# Patient Record
Sex: MALE | Race: WHITE | NOT HISPANIC OR LATINO | Employment: OTHER | URBAN - METROPOLITAN AREA
[De-identification: names, ages, dates, MRNs, and addresses within clinical notes are randomized per-mention and may not be internally consistent; named-entity substitution may affect disease eponyms.]

---

## 2017-05-25 ENCOUNTER — HISTORICAL (OUTPATIENT)
Dept: RADIOLOGY | Facility: HOSPITAL | Age: 77
End: 2017-05-25

## 2017-11-28 ENCOUNTER — HISTORICAL (OUTPATIENT)
Dept: RADIOLOGY | Facility: HOSPITAL | Age: 77
End: 2017-11-28

## 2018-04-30 ENCOUNTER — HISTORICAL (OUTPATIENT)
Dept: ADMINISTRATIVE | Facility: HOSPITAL | Age: 78
End: 2018-04-30

## 2018-05-10 ENCOUNTER — HISTORICAL (OUTPATIENT)
Dept: ADMINISTRATIVE | Facility: HOSPITAL | Age: 78
End: 2018-05-10

## 2018-05-10 LAB
ABS NEUT (OLG): 5.4 X10(3)/MCL (ref 2.1–9.2)
ALBUMIN SERPL-MCNC: 3.2 GM/DL (ref 3.4–5)
ALBUMIN/GLOB SERPL: 1 RATIO (ref 1.1–2)
ALP SERPL-CCNC: 121 UNIT/L (ref 50–136)
ALT SERPL-CCNC: 20 UNIT/L (ref 12–78)
AST SERPL-CCNC: 15 UNIT/L (ref 15–37)
BASOPHILS # BLD AUTO: 0 X10(3)/MCL (ref 0–0.2)
BASOPHILS NFR BLD AUTO: 0.3 %
BILIRUB SERPL-MCNC: 0.3 MG/DL (ref 0.2–1)
BILIRUBIN DIRECT+TOT PNL SERPL-MCNC: 0.1 MG/DL (ref 0–0.5)
BILIRUBIN DIRECT+TOT PNL SERPL-MCNC: 0.2 MG/DL (ref 0–0.8)
BUN SERPL-MCNC: 19 MG/DL (ref 7–18)
CALCIUM SERPL-MCNC: 9 MG/DL (ref 8.5–10.1)
CHLORIDE SERPL-SCNC: 105 MMOL/L (ref 98–107)
CO2 SERPL-SCNC: 29 MMOL/L (ref 21–32)
CREAT SERPL-MCNC: 0.9 MG/DL (ref 0.7–1.3)
EOSINOPHIL # BLD AUTO: 0.3 X10(3)/MCL (ref 0–0.9)
EOSINOPHIL NFR BLD AUTO: 4.2 %
ERYTHROCYTE [DISTWIDTH] IN BLOOD BY AUTOMATED COUNT: 16.5 % (ref 11.5–17)
FOLATE SERPL-MCNC: 18.6 NG/ML (ref 3.1–17.5)
GLOBULIN SER-MCNC: 3.2 GM/DL (ref 2.4–3.5)
GLUCOSE SERPL-MCNC: 96 MG/DL (ref 74–106)
HCT VFR BLD AUTO: 30.3 % (ref 42–52)
HGB BLD-MCNC: 9 GM/DL (ref 14–18)
IRON SATN MFR SERPL: 6.2 % (ref 20–50)
IRON SERPL-MCNC: 22 MCG/DL (ref 50–175)
LDH SERPL-CCNC: 211 UNIT/L (ref 87–241)
LYMPHOCYTES # BLD AUTO: 0.7 X10(3)/MCL (ref 0.6–4.6)
LYMPHOCYTES NFR BLD AUTO: 9.5 %
MCH RBC QN AUTO: 25.9 PG (ref 27–31)
MCHC RBC AUTO-ENTMCNC: 29.7 GM/DL (ref 33–36)
MCV RBC AUTO: 87.3 FL (ref 80–94)
MONOCYTES # BLD AUTO: 0.8 X10(3)/MCL (ref 0.1–1.3)
MONOCYTES NFR BLD AUTO: 10.5 %
NEUTROPHILS # BLD AUTO: 5.4 X10(3)/MCL (ref 2.1–9.2)
NEUTROPHILS NFR BLD AUTO: 75.5 %
PLATELET # BLD AUTO: 289 X10(3)/MCL (ref 130–400)
PMV BLD AUTO: 8.5 FL (ref 9.4–12.4)
POTASSIUM SERPL-SCNC: 4.5 MMOL/L (ref 3.5–5.1)
PROT SERPL-MCNC: 6.4 GM/DL (ref 6.4–8.2)
RBC # BLD AUTO: 3.47 X10(6)/MCL (ref 4.7–6.1)
SODIUM SERPL-SCNC: 140 MMOL/L (ref 136–145)
TIBC SERPL-MCNC: 353 MCG/DL (ref 250–450)
TRANSFERRIN SERPL-MCNC: 276 MG/DL (ref 200–360)
VIT B12 SERPL-MCNC: 870 PG/ML (ref 193–986)
WBC # SPEC AUTO: 7.2 X10(3)/MCL (ref 4.5–11.5)

## 2018-05-15 ENCOUNTER — HISTORICAL (OUTPATIENT)
Dept: RADIOLOGY | Facility: HOSPITAL | Age: 78
End: 2018-05-15

## 2018-08-21 ENCOUNTER — HISTORICAL (OUTPATIENT)
Dept: HEMATOLOGY/ONCOLOGY | Facility: CLINIC | Age: 78
End: 2018-08-21

## 2018-08-21 LAB
ABS NEUT (OLG): 7.99 X10(3)/MCL (ref 2.1–9.2)
ALBUMIN SERPL-MCNC: 3.5 GM/DL (ref 3.4–5)
ALBUMIN/GLOB SERPL: 1 {RATIO}
ALP SERPL-CCNC: 136 UNIT/L (ref 50–136)
ALT SERPL-CCNC: 25 UNIT/L (ref 12–78)
AST SERPL-CCNC: 25 UNIT/L (ref 15–37)
BASOPHILS # BLD AUTO: 0 X10(3)/MCL (ref 0–0.2)
BASOPHILS NFR BLD AUTO: 0.5 %
BILIRUB SERPL-MCNC: 0.2 MG/DL (ref 0.2–1)
BILIRUBIN DIRECT+TOT PNL SERPL-MCNC: 0.1 MG/DL (ref 0–0.2)
BILIRUBIN DIRECT+TOT PNL SERPL-MCNC: 0.1 MG/DL (ref 0–0.8)
BUN SERPL-MCNC: 16 MG/DL (ref 7–18)
CALCIUM SERPL-MCNC: 8.3 MG/DL (ref 8.5–10.1)
CHLORIDE SERPL-SCNC: 101 MMOL/L (ref 98–107)
CO2 SERPL-SCNC: 28 MMOL/L (ref 21–32)
CREAT SERPL-MCNC: 1.03 MG/DL (ref 0.7–1.3)
EOSINOPHIL # BLD AUTO: 0.2 X10(3)/MCL (ref 0–0.9)
EOSINOPHIL NFR BLD AUTO: 2.5 %
ERYTHROCYTE [DISTWIDTH] IN BLOOD BY AUTOMATED COUNT: 22.8 % (ref 11.5–17)
GLOBULIN SER-MCNC: 3.5 GM/DL (ref 2.4–3.5)
GLUCOSE SERPL-MCNC: 117 MG/DL (ref 74–106)
HCT VFR BLD AUTO: 37.7 % (ref 42–52)
HGB BLD-MCNC: 11.5 GM/DL (ref 14–18)
LYMPHOCYTES # BLD AUTO: 0.8 X10(3)/MCL (ref 0.6–4.6)
LYMPHOCYTES NFR BLD AUTO: 7.8 %
MCH RBC QN AUTO: 28.1 PG (ref 27–31)
MCHC RBC AUTO-ENTMCNC: 30.5 GM/DL (ref 33–36)
MCV RBC AUTO: 92.2 FL (ref 80–94)
MONOCYTES # BLD AUTO: 0.8 X10(3)/MCL (ref 0.1–1.3)
MONOCYTES NFR BLD AUTO: 7.8 %
NEUTROPHILS # BLD AUTO: 8 X10(3)/MCL (ref 2.1–9.2)
NEUTROPHILS NFR BLD AUTO: 81.4 %
PLATELET # BLD AUTO: 236 X10(3)/MCL (ref 130–400)
PMV BLD AUTO: 8.5 FL (ref 9.4–12.4)
POC CREATININE: 1 MG/DL (ref 0.6–1.3)
POTASSIUM SERPL-SCNC: 4.2 MMOL/L (ref 3.5–5.1)
PROT SERPL-MCNC: 7 GM/DL (ref 6.4–8.2)
PSA SERPL-MCNC: <0.01 NG/ML (ref 0–4)
RBC # BLD AUTO: 4.09 X10(6)/MCL (ref 4.7–6.1)
SODIUM SERPL-SCNC: 138 MMOL/L (ref 136–145)
WBC # SPEC AUTO: 9.8 X10(3)/MCL (ref 4.5–11.5)

## 2019-05-03 ENCOUNTER — HISTORICAL (OUTPATIENT)
Dept: ADMINISTRATIVE | Facility: HOSPITAL | Age: 79
End: 2019-05-03

## 2019-05-03 LAB
ALBUMIN SERPL-MCNC: 2.9 GM/DL (ref 3.4–5)
ALP SERPL-CCNC: 98 UNIT/L (ref 50–136)
ALT SERPL-CCNC: 17 UNIT/L (ref 12–78)
AST SERPL-CCNC: 13 UNIT/L (ref 15–37)
BILIRUB SERPL-MCNC: 0.4 MG/DL (ref 0.2–1)
BILIRUBIN DIRECT+TOT PNL SERPL-MCNC: 0.1 MG/DL (ref 0–0.2)
BILIRUBIN DIRECT+TOT PNL SERPL-MCNC: 0.3 MG/DL (ref 0–0.8)
BUN SERPL-MCNC: 14 MG/DL (ref 7–18)
CALCIUM SERPL-MCNC: 8.7 MG/DL (ref 8.5–10.1)
CHLORIDE SERPL-SCNC: 103 MMOL/L (ref 98–107)
CHOLEST SERPL-MCNC: 150 MG/DL (ref 0–200)
CHOLEST/HDLC SERPL: 3.3 {RATIO} (ref 0–5)
CO2 SERPL-SCNC: 28 MMOL/L (ref 21–32)
CREAT SERPL-MCNC: 0.7 MG/DL (ref 0.7–1.3)
CREAT/UREA NIT SERPL: 20
GLUCOSE SERPL-MCNC: 107 MG/DL (ref 74–106)
HDLC SERPL-MCNC: 46 MG/DL (ref 35–60)
LDLC SERPL CALC-MCNC: 81 MG/DL (ref 0–129)
LIVER PROFILE INTERP: ABNORMAL
POTASSIUM SERPL-SCNC: 3.7 MMOL/L (ref 3.5–5.1)
PROT SERPL-MCNC: 6.2 GM/DL (ref 6.4–8.2)
SODIUM SERPL-SCNC: 138 MMOL/L (ref 136–145)
TRIGL SERPL-MCNC: 115 MG/DL (ref 30–150)
TSH SERPL-ACNC: 0.16 MIU/L (ref 0.36–3.74)
VLDLC SERPL CALC-MCNC: 23 MG/DL

## 2019-10-11 ENCOUNTER — HISTORICAL (OUTPATIENT)
Dept: RADIOLOGY | Facility: HOSPITAL | Age: 79
End: 2019-10-11

## 2019-10-11 LAB — POC CREATININE: 0.9 MG/DL (ref 0.6–1.3)

## 2019-10-24 ENCOUNTER — HISTORICAL (OUTPATIENT)
Dept: RADIOLOGY | Facility: HOSPITAL | Age: 79
End: 2019-10-24

## 2020-08-06 ENCOUNTER — HISTORICAL (OUTPATIENT)
Dept: RADIOLOGY | Facility: HOSPITAL | Age: 80
End: 2020-08-06

## 2020-08-06 LAB — POC CREATININE: 0.8 MG/DL (ref 0.6–1.3)

## 2020-10-22 ENCOUNTER — HISTORICAL (OUTPATIENT)
Dept: RADIOLOGY | Facility: HOSPITAL | Age: 80
End: 2020-10-22

## 2020-10-22 LAB — POC CREATININE: 1 MG/DL (ref 0.6–1.3)

## 2020-11-25 ENCOUNTER — HISTORICAL (OUTPATIENT)
Dept: RADIOLOGY | Facility: HOSPITAL | Age: 80
End: 2020-11-25

## 2020-11-25 LAB — POC CREATININE: 1 MG/DL (ref 0.6–1.3)

## 2021-11-03 ENCOUNTER — HISTORICAL (OUTPATIENT)
Dept: ADMINISTRATIVE | Facility: HOSPITAL | Age: 81
End: 2021-11-03

## 2021-11-12 ENCOUNTER — HISTORICAL (OUTPATIENT)
Dept: RADIOLOGY | Facility: HOSPITAL | Age: 81
End: 2021-11-12

## 2022-03-10 ENCOUNTER — HISTORICAL (OUTPATIENT)
Dept: RESPIRATORY THERAPY | Facility: HOSPITAL | Age: 82
End: 2022-03-10

## 2022-04-11 ENCOUNTER — HISTORICAL (OUTPATIENT)
Dept: ADMINISTRATIVE | Facility: HOSPITAL | Age: 82
End: 2022-04-11
Payer: MEDICARE

## 2022-04-27 VITALS
OXYGEN SATURATION: 94 % | BODY MASS INDEX: 33.59 KG/M2 | DIASTOLIC BLOOD PRESSURE: 72 MMHG | WEIGHT: 248 LBS | HEIGHT: 72 IN | SYSTOLIC BLOOD PRESSURE: 114 MMHG

## 2022-04-28 ENCOUNTER — HISTORICAL (OUTPATIENT)
Dept: RADIOLOGY | Facility: HOSPITAL | Age: 82
End: 2022-04-28
Payer: MEDICARE

## 2022-05-05 ENCOUNTER — LAB VISIT (OUTPATIENT)
Dept: LAB | Facility: HOSPITAL | Age: 82
End: 2022-05-05
Attending: INTERNAL MEDICINE
Payer: MEDICARE

## 2022-05-05 ENCOUNTER — PROCEDURE VISIT (OUTPATIENT)
Dept: RESPIRATORY THERAPY | Facility: HOSPITAL | Age: 82
End: 2022-05-05
Attending: INTERNAL MEDICINE
Payer: MEDICARE

## 2022-05-05 DIAGNOSIS — R55 SYNCOPE AND COLLAPSE: ICD-10-CM

## 2022-05-05 DIAGNOSIS — J44.9 CHRONIC OBSTRUCTIVE PULMONARY DISEASE WITH BRONCHOSPASM: ICD-10-CM

## 2022-05-05 DIAGNOSIS — R42 DIZZINESS: ICD-10-CM

## 2022-05-05 DIAGNOSIS — R53.81: ICD-10-CM

## 2022-05-05 DIAGNOSIS — J44.89 OBSTRUCTIVE CHRONIC BRONCHITIS WITHOUT EXACERBATION: ICD-10-CM

## 2022-05-05 DIAGNOSIS — E78.00 PURE HYPERCHOLESTEROLEMIA: ICD-10-CM

## 2022-05-05 DIAGNOSIS — K21.9 GASTROESOPHAGEAL REFLUX DISEASE, UNSPECIFIED WHETHER ESOPHAGITIS PRESENT: ICD-10-CM

## 2022-05-05 DIAGNOSIS — R06.02 SHORTNESS OF BREATH: ICD-10-CM

## 2022-05-05 DIAGNOSIS — R53.1 ASTHENIA: ICD-10-CM

## 2022-05-05 DIAGNOSIS — I25.10 CORONARY ATHEROSCLEROSIS OF NATIVE CORONARY ARTERY: ICD-10-CM

## 2022-05-05 DIAGNOSIS — B20: ICD-10-CM

## 2022-05-05 DIAGNOSIS — E11.9 DIABETES: Primary | ICD-10-CM

## 2022-05-05 DIAGNOSIS — E34.9 TESTOSTERONE DEFICIENCY: ICD-10-CM

## 2022-05-05 DIAGNOSIS — R06.00 DYSPNEA, UNSPECIFIED TYPE: ICD-10-CM

## 2022-05-05 LAB
ALBUMIN SERPL-MCNC: 3.1 GM/DL (ref 3.4–4.8)
ALBUMIN/GLOB SERPL: 1.1 RATIO (ref 1.1–2)
ALP SERPL-CCNC: 92 UNIT/L (ref 40–150)
ALT SERPL-CCNC: 18 UNIT/L (ref 0–55)
AST SERPL-CCNC: 21 UNIT/L (ref 5–34)
BASOPHILS # BLD AUTO: 0.04 X10(3)/MCL (ref 0–0.2)
BASOPHILS NFR BLD AUTO: 0.5 %
BILIRUBIN DIRECT+TOT PNL SERPL-MCNC: 0.2 MG/DL (ref 0–0.5)
BILIRUBIN DIRECT+TOT PNL SERPL-MCNC: 0.2 MG/DL (ref 0–0.8)
BILIRUBIN DIRECT+TOT PNL SERPL-MCNC: 0.4 MG/DL
BUN SERPL-MCNC: 16.2 MG/DL (ref 8.4–25.7)
CALCIUM SERPL-MCNC: 9.3 MG/DL (ref 8.8–10)
CHLORIDE SERPL-SCNC: 100 MMOL/L (ref 98–107)
CHOLEST SERPL-MCNC: 142 MG/DL
CHOLEST/HDLC SERPL: 5 {RATIO} (ref 0–5)
CO2 SERPL-SCNC: 30 MMOL/L (ref 23–31)
CREAT SERPL-MCNC: 0.79 MG/DL (ref 0.73–1.18)
EOSINOPHIL # BLD AUTO: 0.24 X10(3)/MCL (ref 0–0.9)
EOSINOPHIL NFR BLD AUTO: 3 %
ERYTHROCYTE [DISTWIDTH] IN BLOOD BY AUTOMATED COUNT: 15.3 % (ref 11.5–17)
EST. AVERAGE GLUCOSE BLD GHB EST-MCNC: 139.9 MG/DL
GLOBULIN SER-MCNC: 2.9 GM/DL (ref 2.4–3.5)
GLUCOSE SERPL-MCNC: 109 MG/DL (ref 82–115)
HBA1C MFR BLD: 6.5 %
HCT VFR BLD AUTO: 38.8 % (ref 42–52)
HDLC SERPL-MCNC: 30 MG/DL (ref 35–60)
HGB BLD-MCNC: 11.9 GM/DL (ref 14–18)
IMM GRANULOCYTES # BLD AUTO: 0.02 X10(3)/MCL (ref 0–0.02)
IMM GRANULOCYTES NFR BLD AUTO: 0.2 % (ref 0–0.43)
INR BLD: 1.16 (ref 0–1.3)
LDLC SERPL CALC-MCNC: 88 MG/DL (ref 50–140)
LYMPHOCYTES # BLD AUTO: 0.8 X10(3)/MCL (ref 0.6–4.6)
LYMPHOCYTES NFR BLD AUTO: 9.9 %
MAGNESIUM SERPL-MCNC: 2 MG/DL (ref 1.6–2.6)
MCH RBC QN AUTO: 27.1 PG (ref 27–31)
MCHC RBC AUTO-ENTMCNC: 30.7 MG/DL (ref 33–36)
MCV RBC AUTO: 88.4 FL (ref 80–94)
MONOCYTES # BLD AUTO: 0.9 X10(3)/MCL (ref 0.1–1.3)
MONOCYTES NFR BLD AUTO: 11.2 %
NEUTROPHILS # BLD AUTO: 6.1 X10(3)/MCL (ref 2.1–9.2)
NEUTROPHILS NFR BLD AUTO: 75.2 %
NRBC BLD AUTO-RTO: 0 %
PLATELET # BLD AUTO: 245 X10(3)/MCL (ref 130–400)
PMV BLD AUTO: 9.8 FL (ref 9.4–12.4)
POTASSIUM SERPL-SCNC: 3.4 MMOL/L (ref 3.5–5.1)
PROT SERPL-MCNC: 6 GM/DL (ref 5.8–7.6)
PROTHROMBIN TIME: 14.5 SECONDS (ref 12.5–14.5)
RBC # BLD AUTO: 4.39 X10(6)/MCL (ref 4.7–6.1)
SODIUM SERPL-SCNC: 143 MMOL/L (ref 136–145)
TRIGL SERPL-MCNC: 122 MG/DL (ref 34–140)
VLDLC SERPL CALC-MCNC: 24 MG/DL
WBC # SPEC AUTO: 8.1 X10(3)/MCL (ref 4.5–11.5)

## 2022-05-05 PROCEDURE — 84403 ASSAY OF TOTAL TESTOSTERONE: CPT

## 2022-05-05 PROCEDURE — 83735 ASSAY OF MAGNESIUM: CPT

## 2022-05-05 PROCEDURE — 80053 COMPREHEN METABOLIC PANEL: CPT

## 2022-05-05 PROCEDURE — 82803 BLOOD GASES ANY COMBINATION: CPT

## 2022-05-05 PROCEDURE — 80061 LIPID PANEL: CPT

## 2022-05-05 PROCEDURE — 36600 WITHDRAWAL OF ARTERIAL BLOOD: CPT

## 2022-05-05 PROCEDURE — 85025 COMPLETE CBC W/AUTO DIFF WBC: CPT

## 2022-05-05 PROCEDURE — 99900035 HC TECH TIME PER 15 MIN (STAT)

## 2022-05-05 PROCEDURE — 85610 PROTHROMBIN TIME: CPT

## 2022-05-05 PROCEDURE — 36415 COLL VENOUS BLD VENIPUNCTURE: CPT

## 2022-05-05 PROCEDURE — 83036 HEMOGLOBIN GLYCOSYLATED A1C: CPT

## 2022-05-05 NOTE — HISTORICAL [facility]
This is a historical note converted from [place]. Formatting and pictures may have been removed.  Please reference [place] for original formatting and attached multimedia. Chief Complaint  9 week follow up Right foot- toe nad Hand fracture from a fall - Non op. Here for eval and xrays. DOing ok c/o pain and swelling. Amb with walker.  History of Present Illness  He is a pleasant 77-year-old who I initially saw in the hospital when he had a right small toe fracture.? He returns today with?swelling over bilateral lower extremities and bilateral hands right worse than left.? He denies any acute injury.? He will occasionally have pain associated with these.? He is mainly ambulating with a walker.  Review of Systems  Comprehensive review of system?was performed with no exceptions other than noted in the history of present illness  Physical Exam  Vitals & Measurements  BP:?146/90?  HT:?182?cm? HT:?182?cm? HT:?182?cm? WT:?108.4?kg? WT:?108.4?kg? WT:?108.4?kg? BMI:?32.73?  Gen: WN, WD, NAD  Card/Res: NL breathing, +distal pulses  Abdomen: ND  Muscular skeletal:?Exam over his right hand does show some soft tissue swelling?right greater than left. ?He is got?ulnar deviation of his small finger. ?He has full range of motion without malrotation.? He is got full strength. ?Distally is neurovascularly intact.? Exam of bilateral lower extremities show some?edema bilaterally. ?He is nontender?over his right fifth?toe.? He has full range of motion of his feet and ankle.? Distally his neurovascular intact  Assessment/Plan  1.?Lower extremity edema  I recommended some compression?hose and I will get him over his primary care physician to evaluate his heart lungs and kidneys.  Ordered:  Office/Outpatient Visit Level 3 Established 71000 PC, Lower extremity edema  Toe fracture, [facility], 04/30/18 11:37:00 CDT  ?  Toe fracture  The fracture is healed.? Activities as tolerated  Ordered:  Office/Outpatient Visit Level 3  Established [id number] PC, Lower extremity edema  Toe fracture, [facility], 04/30/18 11:37:00 CDT  ?  Orders:  Clinic Follow-up PRN, 04/30/18 11:37:00 CDT, Future Order, [facility]  XR Foot Left Minimum 3 Views, Routine, 04/30/18 10:35:00 CDT, pain, None, Ambulatory, Rad Type, 04/30/18 10:35:00 CDT   Problem List/Past Medical History  Ongoing  Able to lie down  ATRIAL FIBRILLATION  BPH (benign prostatic hyperplasia)  Cervicalgia  COPD (chronic obstructive pulmonary disease)  Essential hypertension  Exercise intolerance  Lumbago  Lung cancer  Obesity  Paresthesia of right arm  Tobacco user  Tobacco user  Historical  Apnea, sleep  Cataplexy and narcolepsy  Non-small cell carcinoma of lung  Procedure/Surgical History  Repair Right Hand Skin, External Approach (02/17/2018), Simple repair of superficial wounds of scalp, neck, axillae, external genitalia, trunk and/or extremities (including hands and feet); 2.5 cm or less (02/17/2018), Carotid Artery Endarterectomy (Left) (02/12/2018), Extirpation of Matter from Left Common Carotid Artery, Open Approach (02/12/2018), Extirpation of Matter from Left External Carotid Artery, Open Approach (02/12/2018), Extirpation of Matter from Left Internal Carotid Artery, Open Approach (02/12/2018), Drainage of Right Pleural Cavity, Percutaneous Approach (01/30/2018), Resection of Prostate, Via Natural or Artificial Opening (01/12/2016), Transurethral Resection Prostate (None) (01/12/2016), Transurethral resection; residual or regrowth of obstructive prostate tissue including control of postoperative bleeding, complete (vasectomy, meatotomy, cystourethroscopy, urethral calibration and/or dilation, and internal urethrotomy are included) (01/12/2016), DIRECT ADMISSION OF PATIENT FOR HOSPITAL OBSERVATION CARE (02/25/2014), HOSPITAL OBSERVATION SERVICE, PER HOUR (02/25/2014), HOSPITAL OBSERVATION SERVICE, PER HOUR (02/25/2014), HOSPITAL OBSERVATION SERVICE, PER HOUR  (02/25/2014), HOSPITAL OBSERVATION SERVICE, PER HOUR (02/25/2014), HOSPITAL OBSERVATION SERVICE, PER HOUR (02/25/2014), HOSPITAL OBSERVATION SERVICE, PER HOUR (02/25/2014), HOSPITAL OBSERVATION SERVICE, PER HOUR (02/25/2014), HOSPITAL OBSERVATION SERVICE, PER HOUR (02/25/2014), HOSPITAL OBSERVATION SERVICE, PER HOUR (02/25/2014), HOSPITAL OBSERVATION SERVICE, PER HOUR (02/25/2014), HOSPITAL OBSERVATION SERVICE, PER HOUR (02/25/2014), HOSPITAL OBSERVATION SERVICE, PER HOUR (02/25/2014), HOSPITAL OBSERVATION SERVICE, PER HOUR (02/25/2014), HOSPITAL OBSERVATION SERVICE, PER HOUR (02/25/2014), HOSPITAL OBSERVATION SERVICE, PER HOUR (02/25/2014), HOSPITAL OBSERVATION SERVICE, PER HOUR (02/25/2014), HOSPITAL OBSERVATION SERVICE, PER HOUR (02/25/2014), HOSPITAL OBSERVATION SERVICE, PER HOUR (02/25/2014), HOSPITAL OBSERVATION SERVICE, PER HOUR (02/25/2014), HOSPITAL OBSERVATION SERVICE, PER HOUR (02/25/2014), HOSPITAL OBSERVATION SERVICE, PER HOUR (02/25/2014), HOSPITAL OBSERVATION SERVICE, PER HOUR (02/25/2014), HOSPITAL OBSERVATION SERVICE, PER HOUR (02/25/2014), HOSPITAL OBSERVATION SERVICE, PER HOUR (02/25/2014), HOSPITAL OBSERVATION SERVICE, PER HOUR (02/25/2014), HOSPITAL OBSERVATION SERVICE, PER HOUR (02/25/2014), HOSPITAL OBSERVATION SERVICE, PER HOUR (02/25/2014), Litholapaxy: crushing or fragmentation of calculus by any means in bladder and removal of fragments; simple or small (less than 2.5 cm). (02/25/2014), Lithopaxy (.) (02/25/2014), Other cystoscopy (02/25/2014), Other transurethral prostatectomy (02/25/2014), Transurethral clearance of bladder (02/25/2014), Transurethral electrosurgical resection of prostate, including control of postoperative bleeding, complete (vasectomy, meatotomy, cystourethroscopy, urethral calibration and/or dilation, and internal urethrotomy are included). (02/25/2014), Transurethral Resection Prostate (.) (02/25/2014), Insertion of indwelling urinary catheter (02/09/2014), Insertion  of temporary indwelling bladder catheter; simple (eg, Monsalve). (02/09/2014), Thoracentesis (12/02/2013), Biopsy Gastrointestional (11/27/2013), Colonoscopy (11/27/2013), Endoscopic polypectomy of large intestine (11/27/2013), Esophagogastroduodenoscopy (11/27/2013), Esophagogastroduodenoscopy [EGD] with closed biopsy (11/27/2013), Polypectomy (11/27/2013), Biopsy, lung or mediastinum, percutaneous needle. (03/22/2012), Closed [percutaneous] [needle] biopsy of lung (03/22/2012), Appendectomy;., back surgery, bilateral inguinal hernia repair, Cataract, rotator cuff repair, tonsillectomy.  Medications  albuterol 90 mcg/inh inhalation powder, 2 puff(s), INH, BID, PRN  amantadine 100 mg oral capsule, 100 mg= 1 cap(s), Oral, BID  atorvastatin 40 mg oral tablet, 40 mg= 1 tab(s), Oral, Daily  buPROPion 150 mg oral tablet, extended release, 150 mg= 1 tab(s), Oral, Daily  ELIQUIS 5 MG TAB, 5 mg= 1 tab(s), Oral, BID  famotidine 20 mg oral tablet, 20 mg= 1 tab(s), Oral, BID  ipratropium 500 mcg/2.5 mL inhalation solution, 500 mcg= 2.5 mL, NEB, q6hr Resp  memantine 10 mg oral tablet, 10 mg= 1 tab(s), Oral, BID  Protonix 40 mg ORAL enteric coated tablet, 40 mg= 1 tab(s), Oral, Daily  Prozac 20 mg oral capsule, 20 mg= 1 cap(s), Oral, Daily  Risperdal 1 mg oral tablet, 1 mg= 1 tab(s), Oral, Daily  Spiriva Respimat 2.5 mcg/inh inhalation aerosol, 2 puff(s), INH, Daily, 11 refills  Synthroid 125 mcg (0.125 mg) oral tablet, 125 mcg= 1 tab(s), Oral, Daily  Vitamin B-12 500 mcg oral tablet, 1 tab, Oral, Daily  Allergies  Demerol HCl?(UNKNOWN)  Social History  Alcohol - Low Risk, 11/26/2013  Current, Beer, 01/03/2016  Employment/School  Retired, Highest education level: High school., 01/03/2016  Home/Environment  Lives with Spouse. Living situation: Home/Independent. Home equipment: CPAP/BiPAP. Family/Friends available for support: Yes., 02/19/2018  Nutrition/Health  Regular, 01/03/2016  Substance Abuse - Denies Substance Abuse,  02/25/2014  Never, 05/08/2017  Tobacco - Denies Tobacco Use, 11/26/2013  Current some day smoker, Cigarettes, 10 per day. 57 year(s). Started age 20.0 Years. Stopped age 77 Years., 02/19/2018  Family History  Cancer - unknown origin: Sister.  Diabetes: Father.  Immunizations  Vaccine Date Status Comments   tetanus/diphtheria/pertussis, acel(Tdap) - Not Given Contraindicated - Do not give     Diagnostic Results  Foot radiographs 4/30/2018: 3 views of the left foot?show no fracture or arthrosis

## 2022-05-06 LAB
BE: 7.3
CALCIUM BLD-MCNC: 1.18 MMOL/L
CO2 TOTAL: 33.4
COHB ART: 2.3
HCO3 ARTERIAL: 32 MMOL/L (ref 18–23)
METHB ART: 0.7
O2HB: 92.1
PCO2 ARTERIAL: 45
PH ARTERIAL: 7.46
PO2 ARTERIAL: 62
POTASSIUM BLOOD ARTERIAL: 3
SATURATED O2 ARTERIAL, I-STAT: 92.7
SODIUM BLOOD ARTERIAL: 135
THB ART: 11.4

## 2022-05-06 NOTE — CARE UPDATE
05/05/22 1000   Critical Value Communication   Date Result Received 05/05/22   Time Result Received 1000   Resulting Department of Critical Value resp   Who communicated critical value from resulting department? sd rrt   Critical Test #1 ph   Critical Test #1 Result 7.46   Critical Test #2 pco2   Critical Test #2 Result 45   Critical Test #3  po2   Critical Test #3 Result 62   Critical Test #4 BE   Critical Test #4 Result 7.3   Critical Test #5 hco2   Critical Test #5 Result 32   sO2 92.7  Thb 11.4  Ohb 92.1  COHb 2.3  metHb 0.7  Na 135  K+ 3.0  Ca++ 1.18  Results faxed to dr OTONIEL Hyde's office

## 2022-06-01 ENCOUNTER — HOSPITAL ENCOUNTER (EMERGENCY)
Facility: HOSPITAL | Age: 82
Discharge: HOME | End: 2022-06-01
Attending: STUDENT IN AN ORGANIZED HEALTH CARE EDUCATION/TRAINING PROGRAM
Payer: MEDICARE

## 2022-06-01 VITALS
RESPIRATION RATE: 20 BRPM | SYSTOLIC BLOOD PRESSURE: 126 MMHG | TEMPERATURE: 98 F | HEART RATE: 104 BPM | DIASTOLIC BLOOD PRESSURE: 85 MMHG | OXYGEN SATURATION: 96 %

## 2022-06-01 DIAGNOSIS — R05.9 COUGH: ICD-10-CM

## 2022-06-01 DIAGNOSIS — R19.7 DIARRHEA, UNSPECIFIED TYPE: ICD-10-CM

## 2022-06-01 DIAGNOSIS — R06.02 SOB (SHORTNESS OF BREATH): ICD-10-CM

## 2022-06-01 DIAGNOSIS — R11.2 NAUSEA AND VOMITING, INTRACTABILITY OF VOMITING NOT SPECIFIED, UNSPECIFIED VOMITING TYPE: Primary | ICD-10-CM

## 2022-06-01 LAB
ALBUMIN SERPL-MCNC: 3.2 GM/DL (ref 3.4–4.8)
ALBUMIN/GLOB SERPL: 1.2 RATIO (ref 1.1–2)
ALP SERPL-CCNC: 81 UNIT/L (ref 40–150)
ALT SERPL-CCNC: 22 UNIT/L (ref 0–55)
AST SERPL-CCNC: 23 UNIT/L (ref 5–34)
BASOPHILS # BLD AUTO: 0.03 X10(3)/MCL (ref 0–0.2)
BASOPHILS NFR BLD AUTO: 0.4 %
BILIRUBIN DIRECT+TOT PNL SERPL-MCNC: 0.4 MG/DL
BNP BLD-MCNC: 696.6 PG/ML
BUN SERPL-MCNC: 15.2 MG/DL (ref 8.4–25.7)
CALCIUM SERPL-MCNC: 9.3 MG/DL (ref 8.8–10)
CHLORIDE SERPL-SCNC: 103 MMOL/L (ref 98–107)
CO2 SERPL-SCNC: 26 MMOL/L (ref 23–31)
CREAT SERPL-MCNC: 0.71 MG/DL (ref 0.73–1.18)
EOSINOPHIL # BLD AUTO: 0.07 X10(3)/MCL (ref 0–0.9)
EOSINOPHIL NFR BLD AUTO: 0.9 %
ERYTHROCYTE [DISTWIDTH] IN BLOOD BY AUTOMATED COUNT: 14.6 % (ref 11.5–17)
FLUAV AG UPPER RESP QL IA.RAPID: NOT DETECTED
FLUBV AG UPPER RESP QL IA.RAPID: NOT DETECTED
GLOBULIN SER-MCNC: 2.7 GM/DL (ref 2.4–3.5)
GLUCOSE SERPL-MCNC: 130 MG/DL (ref 82–115)
HCT VFR BLD AUTO: 38.3 % (ref 42–52)
HGB BLD-MCNC: 12 GM/DL (ref 14–18)
IMM GRANULOCYTES # BLD AUTO: 0.03 X10(3)/MCL (ref 0–0.02)
IMM GRANULOCYTES NFR BLD AUTO: 0.4 % (ref 0–0.43)
LYMPHOCYTES # BLD AUTO: 0.68 X10(3)/MCL (ref 0.6–4.6)
LYMPHOCYTES NFR BLD AUTO: 8.8 %
MCH RBC QN AUTO: 27.6 PG (ref 27–31)
MCHC RBC AUTO-ENTMCNC: 31.3 MG/DL (ref 33–36)
MCV RBC AUTO: 88.2 FL (ref 80–94)
MONOCYTES # BLD AUTO: 0.73 X10(3)/MCL (ref 0.1–1.3)
MONOCYTES NFR BLD AUTO: 9.5 %
NEUTROPHILS # BLD AUTO: 6.2 X10(3)/MCL (ref 2.1–9.2)
NEUTROPHILS NFR BLD AUTO: 80 %
NRBC BLD AUTO-RTO: 0 %
PLATELET # BLD AUTO: 254 X10(3)/MCL (ref 130–400)
PMV BLD AUTO: 9.5 FL (ref 9.4–12.4)
POTASSIUM SERPL-SCNC: 3.6 MMOL/L (ref 3.5–5.1)
PROT SERPL-MCNC: 5.9 GM/DL (ref 5.8–7.6)
RBC # BLD AUTO: 4.34 X10(6)/MCL (ref 4.7–6.1)
SARS-COV-2 RNA RESP QL NAA+PROBE: NOT DETECTED
SODIUM SERPL-SCNC: 141 MMOL/L (ref 136–145)
TROPONIN I SERPL-MCNC: 0.01 NG/ML (ref 0–0.04)
WBC # SPEC AUTO: 7.7 X10(3)/MCL (ref 4.5–11.5)

## 2022-06-01 PROCEDURE — 85025 COMPLETE CBC W/AUTO DIFF WBC: CPT | Performed by: PHYSICIAN ASSISTANT

## 2022-06-01 PROCEDURE — 83880 ASSAY OF NATRIURETIC PEPTIDE: CPT | Performed by: PHYSICIAN ASSISTANT

## 2022-06-01 PROCEDURE — 63600175 PHARM REV CODE 636 W HCPCS: Performed by: STUDENT IN AN ORGANIZED HEALTH CARE EDUCATION/TRAINING PROGRAM

## 2022-06-01 PROCEDURE — 25000242 PHARM REV CODE 250 ALT 637 W/ HCPCS: Performed by: PHYSICIAN ASSISTANT

## 2022-06-01 PROCEDURE — 84075 ASSAY ALKALINE PHOSPHATASE: CPT | Performed by: PHYSICIAN ASSISTANT

## 2022-06-01 PROCEDURE — 96374 THER/PROPH/DIAG INJ IV PUSH: CPT

## 2022-06-01 PROCEDURE — 84484 ASSAY OF TROPONIN QUANT: CPT | Performed by: PHYSICIAN ASSISTANT

## 2022-06-01 PROCEDURE — 87636 SARSCOV2 & INF A&B AMP PRB: CPT | Performed by: PHYSICIAN ASSISTANT

## 2022-06-01 PROCEDURE — 96375 TX/PRO/DX INJ NEW DRUG ADDON: CPT

## 2022-06-01 PROCEDURE — 94640 AIRWAY INHALATION TREATMENT: CPT

## 2022-06-01 PROCEDURE — 99285 EMERGENCY DEPT VISIT HI MDM: CPT | Mod: 25

## 2022-06-01 PROCEDURE — 93005 ELECTROCARDIOGRAM TRACING: CPT

## 2022-06-01 PROCEDURE — 80053 COMPREHEN METABOLIC PANEL: CPT | Performed by: PHYSICIAN ASSISTANT

## 2022-06-01 RX ORDER — ONDANSETRON 2 MG/ML
4 INJECTION INTRAMUSCULAR; INTRAVENOUS
Status: COMPLETED | OUTPATIENT
Start: 2022-06-01 | End: 2022-06-01

## 2022-06-01 RX ORDER — MORPHINE SULFATE 4 MG/ML
4 INJECTION, SOLUTION INTRAMUSCULAR; INTRAVENOUS
Status: COMPLETED | OUTPATIENT
Start: 2022-06-01 | End: 2022-06-01

## 2022-06-01 RX ORDER — IPRATROPIUM BROMIDE AND ALBUTEROL SULFATE 2.5; .5 MG/3ML; MG/3ML
3 SOLUTION RESPIRATORY (INHALATION)
Status: COMPLETED | OUTPATIENT
Start: 2022-06-01 | End: 2022-06-01

## 2022-06-01 RX ORDER — ONDANSETRON 4 MG/1
4 TABLET, FILM COATED ORAL EVERY 6 HOURS
Qty: 56 TABLET | Refills: 0 | Status: SHIPPED | OUTPATIENT
Start: 2022-06-01 | End: 2022-06-15

## 2022-06-01 RX ADMIN — MORPHINE SULFATE 4 MG: 4 INJECTION INTRAVENOUS at 07:06

## 2022-06-01 RX ADMIN — ONDANSETRON 4 MG: 2 INJECTION INTRAMUSCULAR; INTRAVENOUS at 07:06

## 2022-06-01 RX ADMIN — IPRATROPIUM BROMIDE AND ALBUTEROL SULFATE 3 ML: .5; 2.5 SOLUTION RESPIRATORY (INHALATION) at 05:06

## 2022-06-01 NOTE — FIRST PROVIDER EVALUATION
"Medical screening exam completed.  I have conducted a focused provider triage encounter, findings are as follows:    Chief Complaint   Patient presents with    Shortness of Breath     Pt to ER via POV for SOB.  Also chest pain.  Pt states "I have this everyday, but today is worse and I'm weak and blacking out", pt states he had a nuc stress test today and goes back in a week for results.  Pt states also vomiting     Brief history of present illness:  81 y.o. male presents to the ED with SOB and chest pain. States this is chronic however worse today. Had nuclear stress test done today, unsure of results. Daughter notes he was diaphoretic PTA.     Vitals:    06/01/22 1656   Pulse: 89   Resp: (!) 24   TempSrc: Oral   SpO2: (!) 94%       Pertinent physical exam:  Awake, alert, ambulatory, non-labored respirations    Brief workup plan:  Labs, EKG, CXR    Preliminary workup initiated; this workup will be continued and followed by the physician or advanced practice provider that is assigned to the patient when roomed.  "

## 2022-06-02 NOTE — ED PROVIDER NOTES
"Encounter Date: 6/1/2022    SCRIBE #1 NOTE: I, Viviane Latham, am scribing for, and in the presence of,  Rikki Burciaga MD. I have scribed the following portions of the note - Other sections scribed: HPI, ROS, PE, EKG.       History     Chief Complaint   Patient presents with    Shortness of Breath     Pt to ER via POV for SOB.  Also chest pain.  Pt states "I have this everyday, but today is worse and I'm weak and blacking out", pt states he had a nuc stress test today and goes back in a week for results.  Pt states also vomiting     82 y/o male with hx of COPD, CAD, DM, HTN, DM, lung cancer, and a-fib presents to the ED complaining of nausea and vomiting for the past month. Associated symptoms include diarrhea, fatigue, weakness, and diaphoresis. The daughter reports the pt needs help getting off of the toilet or out of the bathtub because he is so weak.    The pt states his cardiologist is Dr. Rangel.    The history is provided by the patient and a relative. No  was used.   Emesis   This is a new problem. Illness onset: 4 weeks ago. The problem occurs 5 - 10 times per day. The problem has been unchanged. Associated symptoms include diarrhea. Pertinent negatives include no abdominal pain, no chills, no cough and no fever.     Review of patient's allergies indicates:   Allergen Reactions    Demerol [meperidine] Nausea And Vomiting     Past Medical History:   Diagnosis Date    Atrial fibrillation     COPD (chronic obstructive pulmonary disease)     Coronary artery disease     Diabetes mellitus     Hypertension     Lung cancer     Prostate CA      Past Surgical History:   Procedure Laterality Date    APPENDECTOMY      SHOULDER SURGERY      TONSILLECTOMY       No family history on file.  Social History     Tobacco Use    Smoking status: Former Smoker     Packs/day: 2.00     Years: 60.00     Pack years: 120.00     Types: Cigarettes    Tobacco comment: patient quit smoking 2 years ago  "     Review of Systems   Constitutional: Positive for diaphoresis and fatigue. Negative for chills and fever.   HENT: Negative for congestion, rhinorrhea and sore throat.    Eyes: Negative for visual disturbance.   Respiratory: Negative for cough and shortness of breath.    Cardiovascular: Negative for chest pain.   Gastrointestinal: Positive for diarrhea, nausea and vomiting. Negative for abdominal pain.   Genitourinary: Negative for dysuria and hematuria.   Musculoskeletal: Negative for joint swelling.   Skin: Negative for rash.   Neurological: Positive for weakness.   Psychiatric/Behavioral: Negative for confusion.   All other systems reviewed and are negative.      Physical Exam     Initial Vitals [06/01/22 1656]   BP Pulse Resp Temp SpO2   (!) 109/57 (!) 126 (!) 24 97.5 °F (36.4 °C) (!) 94 %      MAP       --         Physical Exam    Nursing note and vitals reviewed.  Constitutional: He is not diaphoretic. No distress.   HENT:   Head: Normocephalic and atraumatic.   Eyes: EOM are normal. Pupils are equal, round, and reactive to light.   Neck: Neck supple.   Normal range of motion.  Cardiovascular: Normal rate and regular rhythm.   No murmur heard.  Pulmonary/Chest: No respiratory distress. He has no wheezes. He has rhonchi (bilaterally). He has no rales.   Abdominal: Abdomen is soft. He exhibits no distension. There is no abdominal tenderness.   Musculoskeletal:         General: No edema. Normal range of motion.      Cervical back: Normal range of motion and neck supple.     Neurological: He is alert and oriented to person, place, and time. He has normal strength. No cranial nerve deficit.   Skin: Skin is warm. No rash noted.   Psychiatric: He has a normal mood and affect.         ED Course   Procedures  Labs Reviewed   COMPREHENSIVE METABOLIC PANEL - Abnormal; Notable for the following components:       Result Value    Glucose Level 130 (*)     Creatinine 0.71 (*)     Albumin Level 3.2 (*)     All other  components within normal limits   B-TYPE NATRIURETIC PEPTIDE - Abnormal; Notable for the following components:    Natriuretic Peptide 696.6 (*)     All other components within normal limits   CBC WITH DIFFERENTIAL - Abnormal; Notable for the following components:    RBC 4.34 (*)     Hgb 12.0 (*)     Hct 38.3 (*)     MCHC 31.3 (*)     IG# 0.03 (*)     All other components within normal limits   COVID/FLU A&B PCR - Normal   TROPONIN I - Normal   CBC W/ AUTO DIFFERENTIAL    Narrative:     The following orders were created for panel order CBC auto differential.  Procedure                               Abnormality         Status                     ---------                               -----------         ------                     CBC with Differential[[id number]]        Abnormal            Final result                 Please view results for these tests on the individual orders.     EKG Readings: (Independently Interpreted)   Initial Reading: No STEMI. Rhythm: Atrial Fibrillation. Heart Rate: 94. Ectopy: PVCs (occasional). Conduction: Normal. ST Segments: Normal ST Segments. T Waves: Normal. Axis: Normal. [facility] ED EKG2 - Other Findings: LVH, no obvious ischemic changes.   EKG performed at 1655.     ECG Results          EKG 12-lead (Final result)  Result time 06/02/22 07:52:48    Final result by Interface, Lab In [facility] (06/02/22 07:52:48)                 Narrative:    Test Reason : R06.02,    Vent. Rate : 094 BPM     Atrial Rate : 000 BPM     P-R Int : 000 ms          QRS Dur : 094 ms      QT Int : 384 ms       P-R-T Axes : 000 055 077 degrees     QTc Int : 480 ms    Atrial fibrillation with premature ventricular or aberrantly conducted  complexes  Minimal voltage criteria for LVH, may be normal variant ( Sokolow-Knowles )  Prolonged QT  Abnormal ECG  No previous ECGs available  Confirmed by Thanh Tapia MD ([id number]) on 6/2/2022 7:52:31 AM    Referred By:             Confirmed By:Thanh Tapia MD                             Imaging Results          X-Ray Chest 1 View (Final result)  Result time 06/01/22 18:35:17    Final result by Kike Frazier MD (06/01/22 18:35:17)                 Impression:      Redemonstrated patchy right perihilar and basilar opacities, favor atelectasis or chronic changes.  Small volume layering right pleural effusion.      Electronically signed by: Kike Frazier  Date:    06/01/2022  Time:    18:35             Narrative:    EXAMINATION:  XR CHEST 1 VIEW    CLINICAL HISTORY:  Cough, unspecified    TECHNIQUE:  AP view of the chest.    COMPARISON:  Chest radiographs 12/17/2021.    FINDINGS:  The cardiomediastinal silhouette is stable in size and contour. Pulmonary vascularity is within normal limits.  Stable appearing thoracic aortic ectasia.  The lungs are well-inflated and are without new focal consolidation.  Persistent patchy right perihilar and basilar opacities with hazy blunting of the right costophrenic angle suggesting small volume layering right pleural effusion.  No pneumothoraces.    The imaged osseous structures and soft tissues are without acute abnormality.  Redemonstrated postsurgical changes of right shoulder arthroplasty.                              X-Rays:   Independently Interpreted Readings:   Chest X-Ray: No acute abnormalities.     Medications   albuterol-ipratropium 2.5 mg-0.5 mg/3 mL nebulizer solution 3 mL (3 mLs Nebulization Given 6/1/22 1715)   morphine injection 4 mg (4 mg Intravenous Given 6/1/22 1954)   ondansetron injection 4 mg (4 mg Intravenous Given 6/1/22 1954)     Medical Decision Making:   History:   I obtained history from: someone other than patient.       <> Summary of History: daughter  Old Medical Records: I decided to obtain old medical records.  Initial Assessment:   81 with pMHx as above - presenting for multiple complaints including SOB, nonbloody non bilious n/v, fatigue, generalized weakness, for about month. Sounds like a general decline in his ADLs. He is  chronically ill but well appearing, labs and workup with mildy elevated BNP, no significant volume overload on exam. I don't see an indication for admission at this time. Daughter is requesting a GI follow up - I will refer him to GI for his chronic issues.  Patient has cardiology and PCP follow up - will discharge at this time return precautions given.   Independently Interpreted Test(s):   I have ordered and independently interpreted X-rays - see prior notes.  I have ordered and independently interpreted EKG Reading(s) - see prior notes  Clinical Tests:   Lab Tests: Ordered and Reviewed  Radiological Study: Ordered and Reviewed          Scribe Attestation:   Scribe #1: I performed the above scribed service and the documentation accurately describes the services I performed. I attest to the accuracy of the note.                 Clinical Impression:   Final diagnoses:  [R06.02] SOB (shortness of breath)  [R05.9] Cough  [R11.2] Nausea and vomiting, intractability of vomiting not specified, unspecified vomiting type (Primary)  [R19.7] Diarrhea, unspecified type          ED Disposition Condition    Discharge Stable        ED Prescriptions     Medication Sig Dispense Start Date End Date Auth. Provider    ondansetron (ZOFRAN) 4 MG tablet Take 1 tablet (4 mg total) by mouth every 6 (six) hours. for 14 days 56 tablet 6/1/2022 6/15/2022 Rikki Burciaga IV, MD        Follow-up Information     Follow up With Specialties Details Why Contact Info    Waldo Siddiqui MD Internal Medicine Schedule an appointment as soon as possible for a visit   [address]  [place] [address]  [phone]      [facility] - Emergency Dept Emergency Medicine Go to  If symptoms worsen [facility] [facility] [id number] [phone]    Екатерина Toribio III, MD Gastroenterology Schedule an appointment as soon as possible for a visit   [address] [place] [address]  [phone]        [facility]  Rikki Burciaga MD personally performed the history, PE, MDM, and procedures as documented above and agree with the scribe's documentation.      Rikki Burciaga IV, MD  06/02/22 1500

## 2022-06-14 ENCOUNTER — HOSPITAL ENCOUNTER (OUTPATIENT)
Facility: HOSPITAL | Age: 82
Discharge: HOME | End: 2022-06-14
Attending: INTERNAL MEDICINE | Admitting: INTERNAL MEDICINE
Payer: MEDICARE

## 2022-06-14 VITALS
HEART RATE: 81 BPM | HEIGHT: 72 IN | BODY MASS INDEX: 27.59 KG/M2 | SYSTOLIC BLOOD PRESSURE: 130 MMHG | DIASTOLIC BLOOD PRESSURE: 83 MMHG | OXYGEN SATURATION: 85 % | WEIGHT: 203.69 LBS | TEMPERATURE: 98 F | RESPIRATION RATE: 25 BRPM

## 2022-06-14 DIAGNOSIS — R93.1 ABNORMAL ECHOCARDIOGRAM: ICD-10-CM

## 2022-06-14 DIAGNOSIS — R07.9 CHEST PAIN: ICD-10-CM

## 2022-06-14 DIAGNOSIS — I25.10 MILD CAD: Primary | Chronic | ICD-10-CM

## 2022-06-14 DIAGNOSIS — I73.9 PERIPHERAL VASCULAR DISEASE, UNSPECIFIED: ICD-10-CM

## 2022-06-14 DIAGNOSIS — R42 DIZZINESS: ICD-10-CM

## 2022-06-14 PROBLEM — R07.89 ATYPICAL CHEST PAIN: Chronic | Status: ACTIVE | Noted: 2022-06-14

## 2022-06-14 PROBLEM — J44.9 CHRONIC OBSTRUCTIVE PULMONARY DISEASE (COPD): Chronic | Status: ACTIVE | Noted: 2022-06-14

## 2022-06-14 PROBLEM — I27.20 MILD PULMONARY HYPERTENSION: Chronic | Status: ACTIVE | Noted: 2022-06-14

## 2022-06-14 PROBLEM — Z95.828 STATUS POST REPAIR OF ABDOMINAL AORTIC ANEURYSM (AAA) USING BIFURCATION GRAFT: Chronic | Status: ACTIVE | Noted: 2022-06-14

## 2022-06-14 PROBLEM — Z86.79 STATUS POST REPAIR OF ABDOMINAL AORTIC ANEURYSM (AAA) USING BIFURCATION GRAFT: Chronic | Status: ACTIVE | Noted: 2022-06-14

## 2022-06-14 LAB
CATH EF QUANTITATIVE: 60 %
CHOLEST SERPL-MCNC: 155 MG/DL
CHOLEST/HDLC SERPL: 5 {RATIO} (ref 0–5)
HDLC SERPL-MCNC: 34 MG/DL (ref 35–60)
INR BLD: 1.11 (ref 0–1.3)
LDLC SERPL CALC-MCNC: 98 MG/DL (ref 50–140)
POCT GLUCOSE: 102 MG/DL (ref 70–110)
POCT GLUCOSE: 107 MG/DL (ref 70–110)
PROTHROMBIN TIME: 14.2 SECONDS (ref 12.5–14.5)
TRIGL SERPL-MCNC: 115 MG/DL (ref 34–140)
TSH SERPL-ACNC: <0.0083 UIU/ML (ref 0.35–4.94)
VLDLC SERPL CALC-MCNC: 23 MG/DL

## 2022-06-14 PROCEDURE — 25500020 PHARM REV CODE 255: Performed by: INTERNAL MEDICINE

## 2022-06-14 PROCEDURE — 93005 ELECTROCARDIOGRAM TRACING: CPT | Mod: 59

## 2022-06-14 PROCEDURE — 99900035 HC TECH TIME PER 15 MIN (STAT)

## 2022-06-14 PROCEDURE — 75716 ARTERY X-RAYS ARMS/LEGS: CPT | Mod: 59

## 2022-06-14 PROCEDURE — C1894 INTRO/SHEATH, NON-LASER: HCPCS | Performed by: INTERNAL MEDICINE

## 2022-06-14 PROCEDURE — 27000221 HC OXYGEN, UP TO 24 HOURS

## 2022-06-14 PROCEDURE — C1751 CATH, INF, PER/CENT/MIDLINE: HCPCS | Performed by: INTERNAL MEDICINE

## 2022-06-14 PROCEDURE — 99153 MOD SED SAME PHYS/QHP EA: CPT | Performed by: INTERNAL MEDICINE

## 2022-06-14 PROCEDURE — C1887 CATHETER, GUIDING: HCPCS | Performed by: INTERNAL MEDICINE

## 2022-06-14 PROCEDURE — 93460 R&L HRT ART/VENTRICLE ANGIO: CPT

## 2022-06-14 PROCEDURE — 63600175 PHARM REV CODE 636 W HCPCS

## 2022-06-14 PROCEDURE — 85610 PROTHROMBIN TIME: CPT | Performed by: INTERNAL MEDICINE

## 2022-06-14 PROCEDURE — C1769 GUIDE WIRE: HCPCS | Performed by: INTERNAL MEDICINE

## 2022-06-14 PROCEDURE — 25000003 PHARM REV CODE 250: Performed by: INTERNAL MEDICINE

## 2022-06-14 PROCEDURE — 94640 AIRWAY INHALATION TREATMENT: CPT | Mod: XB

## 2022-06-14 PROCEDURE — 99152 MOD SED SAME PHYS/QHP 5/>YRS: CPT | Performed by: INTERNAL MEDICINE

## 2022-06-14 PROCEDURE — 75625 CONTRAST EXAM ABDOMINL AORTA: CPT | Mod: 59

## 2022-06-14 PROCEDURE — 94761 N-INVAS EAR/PLS OXIMETRY MLT: CPT

## 2022-06-14 PROCEDURE — 84443 ASSAY THYROID STIM HORMONE: CPT | Performed by: INTERNAL MEDICINE

## 2022-06-14 PROCEDURE — 36247 INS CATH ABD/L-EXT ART 3RD: CPT | Mod: 59

## 2022-06-14 PROCEDURE — 63600175 PHARM REV CODE 636 W HCPCS: Performed by: INTERNAL MEDICINE

## 2022-06-14 PROCEDURE — 36415 COLL VENOUS BLD VENIPUNCTURE: CPT | Performed by: INTERNAL MEDICINE

## 2022-06-14 PROCEDURE — 80061 LIPID PANEL: CPT | Performed by: INTERNAL MEDICINE

## 2022-06-14 PROCEDURE — 25000242 PHARM REV CODE 250 ALT 637 W/ HCPCS: Performed by: INTERNAL MEDICINE

## 2022-06-14 RX ORDER — ARMODAFINIL 150 MG/1
150 TABLET ORAL EVERY MORNING
COMMUNITY
Start: 2022-04-10 | End: 2022-07-11

## 2022-06-14 RX ORDER — ONDANSETRON 2 MG/ML
4 INJECTION INTRAMUSCULAR; INTRAVENOUS ONCE
Status: COMPLETED | OUTPATIENT
Start: 2022-06-14 | End: 2022-06-14

## 2022-06-14 RX ORDER — ACETYLCYSTEINE 200 MG/ML
2 SOLUTION ORAL; RESPIRATORY (INHALATION) ONCE
Status: COMPLETED | OUTPATIENT
Start: 2022-06-14 | End: 2022-06-14

## 2022-06-14 RX ORDER — DIAZEPAM 5 MG/1
5 TABLET ORAL ONCE
Status: DISPENSED | OUTPATIENT
Start: 2022-06-14

## 2022-06-14 RX ORDER — METHYLPREDNISOLONE SOD SUCC 125 MG
125 VIAL (EA) INJECTION ONCE
Status: COMPLETED | OUTPATIENT
Start: 2022-06-14 | End: 2022-06-14

## 2022-06-14 RX ORDER — LIDOCAINE HYDROCHLORIDE 10 MG/ML
INJECTION INFILTRATION; PERINEURAL
Status: DISCONTINUED | OUTPATIENT
Start: 2022-06-14 | End: 2022-06-15 | Stop reason: HOSPADM

## 2022-06-14 RX ORDER — ONDANSETRON 2 MG/ML
INJECTION INTRAMUSCULAR; INTRAVENOUS
Status: COMPLETED
Start: 2022-06-14 | End: 2022-06-14

## 2022-06-14 RX ORDER — SODIUM CHLORIDE 9 MG/ML
INJECTION, SOLUTION INTRAVENOUS CONTINUOUS
Status: DISCONTINUED | OUTPATIENT
Start: 2022-06-14 | End: 2022-06-14 | Stop reason: HOSPADM

## 2022-06-14 RX ORDER — IPRATROPIUM BROMIDE AND ALBUTEROL SULFATE 2.5; .5 MG/3ML; MG/3ML
3 SOLUTION RESPIRATORY (INHALATION) ONCE AS NEEDED
Status: DISCONTINUED | OUTPATIENT
Start: 2022-06-14 | End: 2022-06-15 | Stop reason: HOSPADM

## 2022-06-14 RX ORDER — FLUOXETINE HYDROCHLORIDE 20 MG/1
20 CAPSULE ORAL DAILY
COMMUNITY
Start: 2022-01-04 | End: 2022-11-14

## 2022-06-14 RX ORDER — FENTANYL CITRATE 50 UG/ML
INJECTION, SOLUTION INTRAMUSCULAR; INTRAVENOUS
Status: DISCONTINUED | OUTPATIENT
Start: 2022-06-14 | End: 2022-06-15 | Stop reason: HOSPADM

## 2022-06-14 RX ORDER — MIDAZOLAM HYDROCHLORIDE 1 MG/ML
INJECTION, SOLUTION INTRAMUSCULAR; INTRAVENOUS
Status: DISCONTINUED | OUTPATIENT
Start: 2022-06-14 | End: 2022-06-15 | Stop reason: HOSPADM

## 2022-06-14 RX ORDER — FERROUS SULFATE 325(65) MG
1 TABLET ORAL DAILY
COMMUNITY

## 2022-06-14 RX ORDER — SODIUM CHLORIDE 0.9 % (FLUSH) 0.9 %
10 SYRINGE (ML) INJECTION
Status: ACTIVE | OUTPATIENT
Start: 2022-06-14

## 2022-06-14 RX ORDER — DIPHENHYDRAMINE HCL 25 MG
50 CAPSULE ORAL
Status: DISPENSED | OUTPATIENT
Start: 2022-06-14

## 2022-06-14 RX ORDER — SODIUM CHLORIDE 9 MG/ML
INJECTION, SOLUTION INTRAVENOUS ONCE
Status: ACTIVE | OUTPATIENT
Start: 2022-06-14

## 2022-06-14 RX ORDER — METHYLPREDNISOLONE SOD SUCC 125 MG
125 VIAL (EA) INJECTION EVERY 6 HOURS
Status: DISCONTINUED | OUTPATIENT
Start: 2022-06-14 | End: 2022-06-14

## 2022-06-14 RX ORDER — FUROSEMIDE 40 MG/1
40 TABLET ORAL DAILY
COMMUNITY
Start: 2022-01-04

## 2022-06-14 RX ADMIN — ONDANSETRON 4 MG: 2 INJECTION INTRAMUSCULAR; INTRAVENOUS at 04:06

## 2022-06-14 RX ADMIN — ACETYLCYSTEINE 2 ML: 200 SOLUTION ORAL; RESPIRATORY (INHALATION) at 05:06

## 2022-06-14 RX ADMIN — METHYLPREDNISOLONE SODIUM SUCCINATE 125 MG: 125 INJECTION, POWDER, FOR SOLUTION INTRAMUSCULAR; INTRAVENOUS at 05:06

## 2022-06-14 NOTE — Clinical Note
An angiography of the  right lower extremity was performed with the sheath and power injected with 12 mL contrast at at 4 mL/s.

## 2022-06-14 NOTE — DISCHARGE SUMMARY
[facility] - Cath Lab Services  Discharge Note  Short Stay    Procedure(s) (LRB):  CATHETERIZATION, HEART, BOTH LEFT AND RIGHT (N/A)  Angiogram, Abdominal Aorta W/ Extremity Runoff    OUTCOME: Patient tolerated treatment/procedure well without complication and is now ready for discharge.    DISPOSITION: Home or Self Care    FINAL DIAGNOSIS:  <principal problem not specified>    FOLLOWUP: In clinic    DISCHARGE INSTRUCTIONS:  No discharge procedures on file.     TIME SPENT ON DISCHARGE: 45 minutes.     Chris Fajardo   1940 06/14/2022      PROCEDURE: Left and right coronary angiography with left heart catheterization and left ventriculogram.     PROCEDURE DESCRIPTION: After obtaining informed consent, the patient was prepped and draped in sterile fashion.  Modified Seldinger technique was employed to place a 5F  sheath in the right femoral artery.  Diagnostic coronary angiography of the left coronary tree was performed with a 5F JL4 catheter.  Diagnostic coronary angiography of the right coronary tree was performed with a 5F JR4 catheter.  The aortic valve was crossed in retrograde fashion with a 5F Pigtail catheter and left ventricular pressues were obtained.  Left ventriculogram was performed via power injection.  Pull pack across the aortic valve was performed to determine pressure gradient across the aortic valve.      A right heart catheterization procedure was also performed after placing a 7 [place] sheath via the right femoral vein.  Access of the pain in the right groin was the assisted by using fluoroscopy and ultrasound.     A peripheral arteriogram of the bilateral lower extremities and abdominal aorta was also performed using a Pigtail catheter in the abdominal aorta, a glide catheter to cross into the left lower extremity and also via the right common femoral artery sheath.       There were no complications.     TOTAL SEDATION MONITORING TIME:40  Minutes.       FINDINGS:  Hemodynamics:  Mild pulmonary hypertension with a mean pulmonary capillary wedge pressure of 11 mmHg.  Pulmonary artery pressure was 41/16 with a mean pressure of 25 mmHg.  Right ventricular pressure was 47 over 2. The mean right atrial pressure was 7 mm Hg.        Left ventricular function: 60%.                 Left main: Mild CAD.     Left anterior descending artery: Mild CAD.      Left Circumflex artery:  Mild CAD.      Right coronary artery: Mild CAD.      Renal arteries: Wnl.      Abdominal aorta: Patent abdominal aorta to b/l DEEPIKA stent graft, otherwise wnl.      Lower extremities: Mild diffuse nonobstructive PAD.         SUMMARY:  1. Mild diffuse CAD.  2. NL LVEF.   3. Mild pulmonary HTN / NL filling pressures.   4. Patent aorta to b/l DEEPIKA stent graft - s/p AAA repair using bifurcating graft.   5. Mild b/l LE PAD.       PLAN:  We will continue guideline directed medical therapy for the coronary artery disease and peripheral arterial disease.  Follow-up with pulmonary COPD issues.          Discharge Summary:  The patient tolerated the angiography procedure rather well.  The discharge diagnoses are as outlined above.  His postoperative course will be monitored closely in the recovery and if he remains in stable condition he will be arranged for discharge later today.  I have asked him to call or return should he have any problems or complaints.  I will be checking on his progress in my office in the very near future where we will continue to monitor his progress closely and titrate his medical therapies as tolerated.

## 2022-06-14 NOTE — BRIEF OP NOTE
Chris Fajardo   1940 06/14/2022      PROCEDURE: Left and right coronary angiography with left heart catheterization and left ventriculogram.     PROCEDURE DESCRIPTION: After obtaining informed consent, the patient was prepped and draped in sterile fashion.  Modified Seldinger technique was employed to place a 5F  sheath in the right femoral artery.  Diagnostic coronary angiography of the left coronary tree was performed with a 5F JL4 catheter.  Diagnostic coronary angiography of the right coronary tree was performed with a 5F JR4 catheter.  The aortic valve was crossed in retrograde fashion with a 5F Pigtail catheter and left ventricular pressues were obtained.  Left ventriculogram was performed via power injection.  Pull pack across the aortic valve was performed to determine pressure gradient across the aortic valve.      A right heart catheterization procedure was also performed after placing a 7 Green[place]ic sheath via the right femoral vein.  Access of the pain in the right groin was the assisted by using fluoroscopy and ultrasound.     A peripheral arteriogram of the bilateral lower extremities and abdominal aorta was also performed using a Pigtail catheter in the abdominal aorta, a glide catheter to cross into the left lower extremity and also via the right common femoral artery sheath.       There were no complications.     TOTAL SEDATION MONITORING TIME:40  Minutes.      FINDINGS:  Hemodynamics:  Mild pulmonary hypertension with a mean pulmonary capillary wedge pressure of 11 mmHg.  Pulmonary artery pressure was 41/16 with a mean pressure of 25 mmHg.  Right ventricular pressure was 47 over 2. The mean right atrial pressure was 7 mm Hg.        Left ventricular function: 60%.                 Left main: Mild CAD.     Left anterior descending artery: Mild CAD.      Left Circumflex artery:  Mild CAD.      Right coronary artery: Mild CAD.      Renal arteries: Wnl.      Abdominal aorta: Patent abdominal  aorta to b/l DEEPIKA stent graft, otherwise wnl.      Lower extremities: Mild diffuse nonobstructive PAD.         SUMMARY:  1. Mild diffuse CAD.  2. NL LVEF.   3. Mild pulmonary HTN / NL filling pressures.   4. Patent aorta to b/l DEEPIKA stent graft - s/p AAA repair using bifurcating graft.   5. Mild b/l LE PAD.       PLAN:  We will continue guideline directed medical therapy for the coronary artery disease and peripheral arterial disease.  Follow-up with pulmonary COPD issues.          Discharge Summary:  The patient tolerated the angiography procedure rather well.  The discharge diagnoses are as outlined above.  His postoperative course will be monitored closely in the recovery and if he remains in stable condition he will be arranged for discharge later today.  I have asked him to call or return should he have any problems or complaints.  I will be checking on his progress in my office in the very near future where we will continue to monitor his progress closely and titrate his medical therapies as tolerated.

## 2022-06-14 NOTE — Clinical Note
The catheter was inserted into the left ventricle. The angiography was performed via power injection.

## 2022-06-14 NOTE — OP NOTE
Chris Fajardo   1940 06/14/2022     PROCEDURE: Left and right coronary angiography with left heart catheterization and left ventriculogram.    PROCEDURE DESCRIPTION: After obtaining informed consent, the patient was prepped and draped in sterile fashion.  Modified Seldinger technique was employed to place a 5F  sheath in the right femoral artery.  Diagnostic coronary angiography of the left coronary tree was performed with a 5F JL4 catheter.  Diagnostic coronary angiography of the right coronary tree was performed with a 5F JR4 catheter.  The aortic valve was crossed in retrograde fashion with a 5F Pigtail catheter and left ventricular pressues were obtained.  Left ventriculogram was performed via power injection.  Pull pack across the aortic valve was performed to determine pressure gradient across the aortic valve.     A right heart catheterization procedure was also performed after placing a 7 Kinyarwanda sheath via the right femoral vein.  Access of the pain in the right groin was the assisted by using fluoroscopy and ultrasound.    A peripheral arteriogram of the bilateral lower extremities and abdominal aorta was also performed using a Pigtail catheter in the abdominal aorta, a glide catheter to cross into the left lower extremity and also via the right common femoral artery sheath.      There were no complications.    TOTAL SEDATION MONITORING TIME:40  Minutes.     FINDINGS:  Hemodynamics:  Mild pulmonary hypertension with a mean pulmonary capillary wedge pressure of 11 mmHg.  Pulmonary artery pressure was 41/16 with a mean pressure of 25 mmHg.  Right ventricular pressure was 47 over 2. The mean right atrial pressure was 7 mm Hg.      Left ventricular function: 60%.     Left main: Mild CAD.    Left anterior descending artery: Mild CAD.     Left Circumflex artery:  Mild CAD.     Right coronary artery: Mild CAD.     Renal arteries: Wnl.     Abdominal aorta: Patent abdominal aorta to b/l DEEPIKA stent graft,  otherwise wnl.     Lower extremities: Mild diffuse nonobstructive PAD.       SUMMARY:  1. Mild diffuse CAD.  2. NL LVEF.   3. Mild pulmonary HTN / NL filling pressures.   4. Patent aorta to b/l DEEPIKA stent graft - s/p AAA repair using bifurcating graft.   5. Mild b/l LE PAD.      PLAN:  We will continue guideline directed medical therapy for the coronary artery disease and peripheral arterial disease.  Follow-up with pulmonary COPD issues.        Discharge Summary:  The patient tolerated the angiography procedure rather well.  The discharge diagnoses are as outlined above.  His postoperative course will be monitored closely in the recovery and if he remains in stable condition he will be arranged for discharge later today.  I have asked him to call or return should he have any problems or complaints.  I will be checking on his progress in my office in the very near future where we will continue to monitor his progress closely and titrate his medical therapies as tolerated.

## 2022-06-14 NOTE — Clinical Note
The catheter was inserted into the aorta. An angiography was performed of the aorta. The angiography was performed via power injection.

## 2022-06-14 NOTE — Clinical Note
The PA catheter was inserted into the inferior vena cava. Hemodynamics were performed. catheter repositioned into RA, RV, PW and PA and hemodynamics recorded.

## 2022-06-14 NOTE — Clinical Note
An angiography of the  left lower extremity selectively was performed with the catheter and power injected with 15 mL contrast at at 10 mL/s.  Left SFA

## 2022-06-14 NOTE — Clinical Note
The catheter was inserted into the and was inserted over the wire into the left ventricle left coronary artery  right coronary artery. An angiography was performed of the left coronary arteries and right coronary arteries. Multiple views were taken. The angiography was performed via power injection.

## 2022-07-11 ENCOUNTER — OFFICE VISIT (OUTPATIENT)
Dept: NEUROLOGY | Facility: CLINIC | Age: 82
End: 2022-07-11
Payer: MEDICARE

## 2022-07-11 VITALS
SYSTOLIC BLOOD PRESSURE: 108 MMHG | HEIGHT: 72 IN | BODY MASS INDEX: 27.77 KG/M2 | DIASTOLIC BLOOD PRESSURE: 66 MMHG | WEIGHT: 205 LBS

## 2022-07-11 DIAGNOSIS — G47.411 NARCOLEPSY AND CATAPLEXY: ICD-10-CM

## 2022-07-11 DIAGNOSIS — Z85.118 HISTORY OF LUNG CANCER: ICD-10-CM

## 2022-07-11 DIAGNOSIS — R11.2 INTRACTABLE NAUSEA AND VOMITING: Primary | ICD-10-CM

## 2022-07-11 PROCEDURE — 99214 PR OFFICE/OUTPT VISIT, EST, LEVL IV, 30-39 MIN: ICD-10-PCS | Mod: S$PBB,,, | Performed by: NURSE PRACTITIONER

## 2022-07-11 PROCEDURE — 99214 OFFICE O/P EST MOD 30 MIN: CPT | Mod: S$PBB,,, | Performed by: NURSE PRACTITIONER

## 2022-07-11 PROCEDURE — 99999 PR PBB SHADOW E&M-EST. PATIENT-LVL IV: ICD-10-PCS | Mod: PBBFAC,,, | Performed by: NURSE PRACTITIONER

## 2022-07-11 PROCEDURE — 99214 OFFICE O/P EST MOD 30 MIN: CPT | Mod: PBBFAC | Performed by: NURSE PRACTITIONER

## 2022-07-11 PROCEDURE — 99999 PR PBB SHADOW E&M-EST. PATIENT-LVL IV: CPT | Mod: PBBFAC,,, | Performed by: NURSE PRACTITIONER

## 2022-07-11 RX ORDER — NALOXEGOL OXALATE 25 MG/1
25 TABLET, FILM COATED ORAL DAILY
COMMUNITY
Start: 2022-06-15

## 2022-07-11 RX ORDER — PANTOPRAZOLE SODIUM 40 MG/1
40 TABLET, DELAYED RELEASE ORAL
COMMUNITY
Start: 2022-01-04

## 2022-07-11 RX ORDER — ASPIRIN 81 MG/1
81 TABLET ORAL DAILY
COMMUNITY

## 2022-07-11 RX ORDER — ONDANSETRON 4 MG/1
4 TABLET, ORALLY DISINTEGRATING ORAL ONCE
COMMUNITY

## 2022-07-11 NOTE — PROGRESS NOTES
Established Patient   SUBJECTIVE:    Patient ID: Chris Fajardo   81 y.o.     Past Medical History:   Diagnosis Date    Atrial fibrillation     Chronic obstructive pulmonary disease (COPD) 6/14/2022    COPD (chronic obstructive pulmonary disease)     Coronary artery disease     Diabetes mellitus     Hypertension     Lung cancer     Mild CAD 6/14/2022    Peripheral vascular disease, unspecified 6/14/2022    Prostate CA     Status post repair of abdominal aortic aneurysm (AAA) using bifurcation graft 6/14/2022    Stroke      Past Surgical History:   Procedure Laterality Date    APPENDECTOMY      CARDIAC CATHETERIZATION      CATHETERIZATION OF BOTH LEFT AND RIGHT HEART N/A 6/14/2022    Procedure: CATHETERIZATION, HEART, BOTH LEFT AND RIGHT;  Surgeon: Donny Rangel DO;  Location: [facility] CATH LAB;  Service: Cardiology;  Laterality: N/A;  RLHC POSS LE ANGIO    SHOULDER SURGERY      TONSILLECTOMY       Family History   Problem Relation Age of Onset    Diabetes Father     Cancer Sister     Cancer Brother      Social History     Socioeconomic History    Marital status:    Tobacco Use    Smoking status: Former Smoker     Packs/day: 2.00     Years: 60.00     Pack years: 120.00     Types: Cigarettes    Smokeless tobacco: Never Used    Tobacco comment: patient quit smoking 2 years ago    Substance and Sexual Activity    Alcohol use: Not Currently    Drug use: Never     Review of patient's allergies indicates:   Allergen Reactions    Demerol [meperidine] Nausea And Vomiting       Chief Complaint:  f/u    History of Present Illness:     Pts wife is here with the pt. Today. Pt states he was referred her to have to Dr. Hartmann look over his meds and see if one of the meds could be causing his symptoms (dry heaving, nausea and vomiting x 2 mo)    Pt has  been throwing up with fatigue on and off since May. Pt states he is very weak and is doing therapy and is gaining a little strength  "back.    Follows up at [facility] for his lung cancer. Saw Dr at [facility] in May and was told everything was "OK."    Review of Systems - as per HPI, otherwise a balanced 10 systems review is negative.      Current Medications:    Current Outpatient Medications:     apixaban (ELIQUIS) 5 mg Tab, Take 5 mg by mouth once daily., Disp: , Rfl:     aspirin (ECOTRIN) 81 MG EC tablet, Take 81 mg by mouth once daily., Disp: , Rfl:     ferrous sulfate (FEOSOL) 325 mg (65 mg iron) Tab tablet, Take 1 tablet by mouth once daily., Disp: , Rfl:     FLUoxetine 20 MG capsule, Take 20 mg by mouth once daily., Disp: , Rfl:     furosemide (LASIX) 40 MG tablet, Take 40 mg by mouth once daily., Disp: , Rfl:     levalbuterol (XOPENEX) 1.25 mg/3 mL nebulizer solution, Take 1 ampule by nebulization 4 (four) times daily., Disp: , Rfl:     metFORMIN (GLUCOPHAGE) 500 MG tablet, Take 500 mg by mouth daily with breakfast., Disp: , Rfl:     metoprolol succinate 25 mg CSpX, Take 25 mg by mouth., Disp: , Rfl:     metoprolol tartrate (LOPRESSOR) 25 MG tablet, Take 25 mg by mouth 2 (two) times daily., Disp: , Rfl:     MOVANTIK 25 mg tablet, Take 25 mg by mouth once daily., Disp: , Rfl:     ondansetron (ZOFRAN-ODT) 4 MG TbDL, Take 4 mg by mouth once., Disp: , Rfl:     pantoprazole (PROTONIX) 40 MG tablet, Take 40 mg by mouth., Disp: , Rfl:     sotaloL (BETAPACE) 80 MG tablet, Take 80 mg by mouth 2 (two) times daily., Disp: , Rfl:     tiotropium (SPIRIVA) 18 mcg inhalation capsule, Inhale 18 mcg into the lungs., Disp: , Rfl:     VENTOLIN HFA 90 mcg/actuation inhaler, INHALE 2 PUFFS BY MOUTH EVERY 4 HOURS AS NEEDED FOR SHORTNESS OF BREATH OR WHEEZING, Disp: , Rfl:   No current facility-administered medications for this visit.    Facility-Administered Medications Ordered in Other Visits:     0.9%  NaCl infusion, , Intravenous, Once, Donny Rangel,     diazePAM tablet 5 mg, 5 mg, Oral, Once, Donny Rangel, DO    diphenhydrAMINE " capsule 50 mg, 50 mg, Oral, On Call Procedure, Donny Rangel DO    sodium chloride 0.9% flush 10 mL, 10 mL, Intravenous, PRN, Donny Rangel DO      OBJECTIVE:  Vitals:  /66   Ht 6' (1.829 m)   Wt 93 kg (205 lb)   BMI 27.80 kg/m²      Physical Exam:  Constitutional  he appears well-developed and well-nourished. he is well groomed. NAD   Accompanied by - wife  Appearance - well appearing, no apparent distress     Neurologic  Cortical function - The patient is alert, attentive  Speech - clear and fluent with good repetition, comprehension and naming.  Cranial nerves:  CN 3, 4, 6 EOMs - normal. No ptosis or lateral gaze deviation  CN 7 - no face asymmetry; normal eye closure and smile  CN 8 - hearing is grossly normal  Motor - good eye closure and cheek puff  No tongue atrophy  Absent jaw jerk    Review of Data:        Labs:  Admission on 06/14/2022, Discharged on 06/14/2022   Component Date Value Ref Range Status    Cath EF Quantitative 06/14/2022 60  % Final    Cholesterol Total 06/14/2022 155  <=200 mg/dL Final    HDL Cholesterol 06/14/2022 34 (A) 35 - 60 mg/dL Final    Triglyceride 06/14/2022 115  34 - 140 mg/dL Final    Cholesterol/HDL Ratio 06/14/2022 5  0 - 5 Final    Very Low Density Lipoprotein 06/14/2022 23   Final    LDL Cholesterol 06/14/2022 98.00  50.00 - 140.00 mg/dL Final    Thyroid Stimulating Hormone 06/14/2022 <0.0083 (A) 0.3500 - 4.9400 uIU/mL Final    PT 06/14/2022 14.2  12.5 - 14.5 seconds Final    INR 06/14/2022 1.11  0.00 - 1.30 Final    POCT Glucose 06/14/2022 107  70 - 110 mg/dL Final    POCT Glucose 06/14/2022 102  70 - 110 mg/dL Final   Admission on 06/01/2022, Discharged on 06/01/2022   Component Date Value Ref Range Status    Sodium Level 06/01/2022 141  136 - 145 mmol/L Final    Potassium Level 06/01/2022 3.6  3.5 - 5.1 mmol/L Final    Chloride 06/01/2022 103  98 - 107 mmol/L Final    Carbon Dioxide 06/01/2022 26  23 - 31 mmol/L Final    Glucose Level  06/01/2022 130 (A) 82 - 115 mg/dL Final    Blood Urea Nitrogen 06/01/2022 15.2  8.4 - 25.7 mg/dL Final    Creatinine 06/01/2022 0.71 (A) 0.73 - 1.18 mg/dL Final    Calcium Level Total 06/01/2022 9.3  8.8 - 10.0 mg/dL Final    Protein Total 06/01/2022 5.9  5.8 - 7.6 gm/dL Final    Albumin Level 06/01/2022 3.2 (A) 3.4 - 4.8 gm/dL Final    Globulin 06/01/2022 2.7  2.4 - 3.5 gm/dL Final    Albumin/Globulin Ratio 06/01/2022 1.2  1.1 - 2.0 ratio Final    Bilirubin Total 06/01/2022 0.4  <=1.5 mg/dL Final    Alkaline Phosphatase 06/01/2022 81  40 - 150 unit/L Final    Alanine Aminotransferase 06/01/2022 22  0 - 55 unit/L Final    Aspartate Aminotransferase 06/01/2022 23  5 - 34 unit/L Final    Estimated GFR-Non  06/01/2022 >60  mls/min/1.73/m2 Final    Influenza A PCR 06/01/2022 Not Detected  Not Detected Final    Influenza B PCR 06/01/2022 Not Detected  Not Detected Final    SARS-CoV-2 PCR 06/01/2022 Not Detected  Not Detected Final    Troponin-I 06/01/2022 0.014  0.000 - 0.045 ng/mL Final    Natriuretic Peptide 06/01/2022 696.6 (A) <=100.0 pg/mL Final    WBC 06/01/2022 7.7  4.5 - 11.5 x10(3)/mcL Final    RBC 06/01/2022 4.34 (A) 4.70 - 6.10 x10(6)/mcL Final    Hgb 06/01/2022 12.0 (A) 14.0 - 18.0 gm/dL Final    Hct 06/01/2022 38.3 (A) 42.0 - 52.0 % Final    MCV 06/01/2022 88.2  80.0 - 94.0 fL Final    MCH 06/01/2022 27.6  27.0 - 31.0 pg Final    MCHC 06/01/2022 31.3 (A) 33.0 - 36.0 mg/dL Final    RDW 06/01/2022 14.6  11.5 - 17.0 % Final    Platelet 06/01/2022 254  130 - 400 x10(3)/mcL Final    MPV 06/01/2022 9.5  9.4 - 12.4 fL Final    Neut % 06/01/2022 80.0  % Final    Lymph % 06/01/2022 8.8  % Final    Mono % 06/01/2022 9.5  % Final    Eos % 06/01/2022 0.9  % Final    Basophil % 06/01/2022 0.4  % Final    Lymph # 06/01/2022 0.68  0.6 - 4.6 x10(3)/mcL Final    Neut # 06/01/2022 6.2  2.1 - 9.2 x10(3)/mcL Final    Mono # 06/01/2022 0.73  0.1 - 1.3 x10(3)/mcL Final    Eos #  06/01/2022 0.07  0 - 0.9 x10(3)/mcL Final    Baso # 06/01/2022 0.03  0 - 0.2 x10(3)/mcL Final    IG# 06/01/2022 0.03 (A) 0 - 0.0155 x10(3)/mcL Final    IG% 06/01/2022 0.4  0 - 0.43 % Final    NRBC% 06/01/2022 0.0  % Final   Lab Visit on 05/05/2022   Component Date Value Ref Range Status    Hemoglobin A1c 05/05/2022 6.5  <=7.0 % Final    Estimated Average Glucose 05/05/2022 139.9  mg/dL Final    Magnesium Level 05/05/2022 2.00  1.60 - 2.60 mg/dL Final    Cholesterol Total 05/05/2022 142  <=200 mg/dL Final    HDL Cholesterol 05/05/2022 30 (A) 35 - 60 mg/dL Final    Triglyceride 05/05/2022 122  34 - 140 mg/dL Final    Cholesterol/HDL Ratio 05/05/2022 5  0 - 5 Final    Very Low Density Lipoprotein 05/05/2022 24   Final    LDL Cholesterol 05/05/2022 88.00  50.00 - 140.00 mg/dL Final    PT 05/05/2022 14.5  12.5 - 14.5 seconds Final    INR 05/05/2022 1.16  0.00 - 1.30 Final    Sodium Level 05/05/2022 143  136 - 145 mmol/L Final    Potassium Level 05/05/2022 3.4 (A) 3.5 - 5.1 mmol/L Final    Chloride 05/05/2022 100  98 - 107 mmol/L Final    Carbon Dioxide 05/05/2022 30  23 - 31 mmol/L Final    Glucose Level 05/05/2022 109  82 - 115 mg/dL Final    Blood Urea Nitrogen 05/05/2022 16.2  8.4 - 25.7 mg/dL Final    Creatinine 05/05/2022 0.79  0.73 - 1.18 mg/dL Final    Calcium Level Total 05/05/2022 9.3  8.8 - 10.0 mg/dL Final    Protein Total 05/05/2022 6.0  5.8 - 7.6 gm/dL Final    Albumin Level 05/05/2022 3.1 (A) 3.4 - 4.8 gm/dL Final    Globulin 05/05/2022 2.9  2.4 - 3.5 gm/dL Final    Albumin/Globulin Ratio 05/05/2022 1.1  1.1 - 2.0 ratio Final    Bilirubin Total 05/05/2022 0.4  <=1.5 mg/dL Final    Bilirubin Direct 05/05/2022 0.2  0.0 - 0.5 mg/dL Final    Bilirubin Indirect 05/05/2022 0.20  0.00 - 0.80 mg/dL Final    Alkaline Phosphatase 05/05/2022 92  40 - 150 unit/L Final    Alanine Aminotransferase 05/05/2022 18  0 - 55 unit/L Final    Aspartate Aminotransferase 05/05/2022 21  5 - 34  unit/L Final    Estimated GFR-Non  05/05/2022 >60  mls/min/1.73/m2 Final    WBC 05/05/2022 8.1  4.5 - 11.5 x10(3)/mcL Final    RBC 05/05/2022 4.39 (A) 4.70 - 6.10 x10(6)/mcL Final    Hgb 05/05/2022 11.9 (A) 14.0 - 18.0 gm/dL Final    Hct 05/05/2022 38.8 (A) 42.0 - 52.0 % Final    MCV 05/05/2022 88.4  80.0 - 94.0 fL Final    MCH 05/05/2022 27.1  27.0 - 31.0 pg Final    MCHC 05/05/2022 30.7 (A) 33.0 - 36.0 mg/dL Final    RDW 05/05/2022 15.3  11.5 - 17.0 % Final    Platelet 05/05/2022 245  130 - 400 x10(3)/mcL Final    MPV 05/05/2022 9.8  9.4 - 12.4 fL Final    Neut % 05/05/2022 75.2  % Final    Lymph % 05/05/2022 9.9  % Final    Mono % 05/05/2022 11.2  % Final    Eos % 05/05/2022 3.0  % Final    Basophil % 05/05/2022 0.5  % Final    Lymph # 05/05/2022 0.80  0.6 - 4.6 x10(3)/mcL Final    Neut # 05/05/2022 6.1  2.1 - 9.2 x10(3)/mcL Final    Mono # 05/05/2022 0.90  0.1 - 1.3 x10(3)/mcL Final    Eos # 05/05/2022 0.24  0 - 0.9 x10(3)/mcL Final    Baso # 05/05/2022 0.04  0 - 0.2 x10(3)/mcL Final    IG# 05/05/2022 0.02 (A) 0 - 0.0155 x10(3)/mcL Final    IG% 05/05/2022 0.2  0 - 0.43 % Final    NRBC% 05/05/2022 0.0  % Final   Procedure visit on 05/05/2022   Component Date Value Ref Range Status    PH ARTERIAL 05/05/2022 7.46   Final    PCO2 ARTERIAL 05/05/2022 45   Final    PO2 ARTERIAL 05/05/2022 62   Final    BE 05/05/2022 7.3   Final    HCO3, Arterial 05/05/2022 32.0 (A) 18.0 - 23.0 MMOL/L Final    CO2 TOTAL 05/05/2022 33.4   Final    O2 Sat, Art 05/05/2022 92.7   Final    THB ART 05/05/2022 11.4   Final    COHB ART 05/05/2022 2.3   Final    O2HB 05/05/2022 92.1   Final    METHB ART 05/05/2022 0.7   Final    Sodium 05/05/2022 135   Final    Potassium, Bld 05/05/2022 3.0   Final    Ionized Calcium 05/05/2022 1.18  mmol/L Final          ASSESSMENT /PLAN:    1. Intractable nausea with vomiting  - Discussed that Fluoxetine could cause N/V; however, pt has been on for  years    2. H/o lung cancer  - Will order MRI brain w/ and w/out     3. narcolepsy         Questions and concerns were sought and answered to the patient's stated verbal satisfaction.    The patient verbalizes understanding and agreement with the above stated treatment plan.   IMarissa, am scribing for, and in the presence of, Dr. Nikko Hartmann    Items discussed include acute and/or chronic neurological, sleep, or other issues and their attendant differential diagnoses.  Potential for additional testing, treatment options, and prognosis also discussed.    ___single dx __*_multiple issues/ diagnoses  ___ low __mod ___* high complexity of data  ___low __*mod ___ high risks     Medical Decision Making (MDM) used for CPT choice:  ___low  __*_moderate  ____high        ROSEANNA Shaw  [facility]  [phone]

## 2022-07-28 ENCOUNTER — ANESTHESIA (OUTPATIENT)
Dept: ENDOSCOPY | Facility: HOSPITAL | Age: 82
End: 2022-07-28
Payer: MEDICARE

## 2022-07-28 ENCOUNTER — HOSPITAL ENCOUNTER (OUTPATIENT)
Facility: HOSPITAL | Age: 82
Discharge: HOME | End: 2022-07-28
Attending: INTERNAL MEDICINE | Admitting: INTERNAL MEDICINE
Payer: MEDICARE

## 2022-07-28 ENCOUNTER — ANESTHESIA EVENT (OUTPATIENT)
Dept: ENDOSCOPY | Facility: HOSPITAL | Age: 82
End: 2022-07-28
Payer: MEDICARE

## 2022-07-28 VITALS
TEMPERATURE: 97 F | HEIGHT: 72 IN | DIASTOLIC BLOOD PRESSURE: 67 MMHG | BODY MASS INDEX: 27.63 KG/M2 | SYSTOLIC BLOOD PRESSURE: 114 MMHG | RESPIRATION RATE: 19 BRPM | OXYGEN SATURATION: 97 % | WEIGHT: 204 LBS | HEART RATE: 93 BPM

## 2022-07-28 DIAGNOSIS — R11.10 VOMITING, INTRACTABILITY OF VOMITING NOT SPECIFIED, PRESENCE OF NAUSEA NOT SPECIFIED, UNSPECIFIED VOMITING TYPE: ICD-10-CM

## 2022-07-28 DIAGNOSIS — K21.9 GASTROESOPHAGEAL REFLUX DISEASE, UNSPECIFIED WHETHER ESOPHAGITIS PRESENT: ICD-10-CM

## 2022-07-28 DIAGNOSIS — R12 PYROSIS: ICD-10-CM

## 2022-07-28 DIAGNOSIS — R13.10 DYSPHAGIA, UNSPECIFIED TYPE: ICD-10-CM

## 2022-07-28 PROCEDURE — 27201423 OPTIME MED/SURG SUP & DEVICES STERILE SUPPLY: Performed by: INTERNAL MEDICINE

## 2022-07-28 PROCEDURE — 25000003 PHARM REV CODE 250: Performed by: NURSE ANESTHETIST, CERTIFIED REGISTERED

## 2022-07-28 PROCEDURE — 88305 TISSUE EXAM BY PATHOLOGIST: CPT | Performed by: INTERNAL MEDICINE

## 2022-07-28 PROCEDURE — 25000003 PHARM REV CODE 250: Performed by: INTERNAL MEDICINE

## 2022-07-28 PROCEDURE — 63600175 PHARM REV CODE 636 W HCPCS: Performed by: NURSE ANESTHETIST, CERTIFIED REGISTERED

## 2022-07-28 PROCEDURE — C1769 GUIDE WIRE: HCPCS | Performed by: INTERNAL MEDICINE

## 2022-07-28 PROCEDURE — 37000009 HC ANESTHESIA EA ADD 15 MINS: Performed by: INTERNAL MEDICINE

## 2022-07-28 PROCEDURE — 37000008 HC ANESTHESIA 1ST 15 MINUTES: Performed by: INTERNAL MEDICINE

## 2022-07-28 PROCEDURE — 43239 EGD BIOPSY SINGLE/MULTIPLE: CPT | Performed by: INTERNAL MEDICINE

## 2022-07-28 RX ORDER — SODIUM CHLORIDE 9 MG/ML
INJECTION, SOLUTION INTRAVENOUS CONTINUOUS
Status: CANCELLED | OUTPATIENT
Start: 2022-07-28

## 2022-07-28 RX ORDER — METOCLOPRAMIDE HYDROCHLORIDE 5 MG/ML
10 INJECTION INTRAMUSCULAR; INTRAVENOUS EVERY 10 MIN PRN
Status: DISCONTINUED | OUTPATIENT
Start: 2022-07-28 | End: 2022-07-28 | Stop reason: HOSPADM

## 2022-07-28 RX ORDER — PROPOFOL 10 MG/ML
VIAL (ML) INTRAVENOUS
Status: DISCONTINUED | OUTPATIENT
Start: 2022-07-28 | End: 2022-07-28

## 2022-07-28 RX ORDER — LIDOCAINE HYDROCHLORIDE 20 MG/ML
INJECTION, SOLUTION EPIDURAL; INFILTRATION; INTRACAUDAL; PERINEURAL
Status: DISCONTINUED
Start: 2022-07-28 | End: 2022-07-28 | Stop reason: HOSPADM

## 2022-07-28 RX ORDER — SODIUM CHLORIDE, SODIUM LACTATE, POTASSIUM CHLORIDE, CALCIUM CHLORIDE 600; 310; 30; 20 MG/100ML; MG/100ML; MG/100ML; MG/100ML
INJECTION, SOLUTION INTRAVENOUS CONTINUOUS PRN
Status: DISCONTINUED | OUTPATIENT
Start: 2022-07-28 | End: 2022-07-28

## 2022-07-28 RX ORDER — LIDOCAINE HCL/PF 100 MG/5ML
SYRINGE (ML) INTRAVENOUS
Status: DISCONTINUED | OUTPATIENT
Start: 2022-07-28 | End: 2022-07-28

## 2022-07-28 RX ORDER — LIDOCAINE HYDROCHLORIDE 10 MG/ML
1 INJECTION, SOLUTION EPIDURAL; INFILTRATION; INTRACAUDAL; PERINEURAL ONCE
Status: CANCELLED | OUTPATIENT
Start: 2022-07-28 | End: 2022-07-28

## 2022-07-28 RX ORDER — ONDANSETRON 2 MG/ML
4 INJECTION INTRAMUSCULAR; INTRAVENOUS ONCE
Status: DISCONTINUED | OUTPATIENT
Start: 2022-07-28 | End: 2022-07-28 | Stop reason: HOSPADM

## 2022-07-28 RX ORDER — SODIUM CHLORIDE, SODIUM GLUCONATE, SODIUM ACETATE, POTASSIUM CHLORIDE AND MAGNESIUM CHLORIDE 30; 37; 368; 526; 502 MG/100ML; MG/100ML; MG/100ML; MG/100ML; MG/100ML
1000 INJECTION, SOLUTION INTRAVENOUS CONTINUOUS
Status: DISCONTINUED | OUTPATIENT
Start: 2022-07-28 | End: 2022-07-28

## 2022-07-28 RX ADMIN — SODIUM CHLORIDE, POTASSIUM CHLORIDE, SODIUM LACTATE AND CALCIUM CHLORIDE: 600; 310; 30; 20 INJECTION, SOLUTION INTRAVENOUS at 08:07

## 2022-07-28 RX ADMIN — SODIUM CHLORIDE, SODIUM GLUCONATE, SODIUM ACETATE, POTASSIUM CHLORIDE AND MAGNESIUM CHLORIDE 1000 ML: 526; 502; 368; 37; 30 INJECTION, SOLUTION INTRAVENOUS at 07:07

## 2022-07-28 RX ADMIN — PROPOFOL 200 MG: 10 INJECTION, EMULSION INTRAVENOUS at 08:07

## 2022-07-28 RX ADMIN — LIDOCAINE HYDROCHLORIDE 20 MG: 20 INJECTION, SOLUTION INTRAVENOUS at 08:07

## 2022-07-28 NOTE — ANESTHESIA POSTPROCEDURE EVALUATION
Anesthesia Post Evaluation    Patient: Chris Fajardo    Procedure(s) Performed: Procedure(s) (LRB):  EGD (N/A)  ESOPHAGOSCOPY, WITH DILATION  EGD, WITH CLOSED BIOPSY  EGD, WITH FOREIGN BODY REMOVAL    Final Anesthesia Type: MAC      Patient location during evaluation: PACU  Patient participation: Yes- Able to Participate  Level of consciousness: awake and alert  Post-procedure vital signs: reviewed and stable  Pain management: adequate  Airway patency: patent  EUGENIA mitigation strategies: Extubation and recovery carried out in lateral, semiupright, or other nonsupine position  PONV status at discharge: No PONV  Anesthetic complications: no      Cardiovascular status: blood pressure returned to baseline  Respiratory status: room air and unassisted  Hydration status: euvolemic  Follow-up not needed.  Comments: PT. Appears to have adequately recovered for Anesthetic. VSS, airway patient. No apparent complications noted.          Vitals Value Taken Time   /67 07/28/22 0856   Temp 36.3 °C (97.3 °F) 07/28/22 0838   Pulse 93 07/28/22 0856   Resp 19 07/28/22 0856   SpO2 97 % 07/28/22 0856         No case tracking events are documented in the log.      Pain/Vijay Score: Vijay Score: 10 (7/28/2022  8:58 AM)

## 2022-07-28 NOTE — PROVATION PATIENT INSTRUCTIONS
Discharge Summary/Instructions after an Endoscopic Procedure  Patient Name: Chris Fajardo  Patient MRN: [id number]  Patient YOB: 1940 Thursday, July 28, 2022  LANDEN Barry MD  Dear patient,  As a result of recent federal legislation (The Federal Cures Act), you may   receive lab or pathology results from your procedure in your [facility]   account before your physician is able to contact you. Your physician or   their representative will relay the results to you with their   recommendations at their soonest availability.  Thank you,  RESTRICTIONS:  During your procedure today, you received medications for sedation.  These   medications may affect your judgment, balance and coordination.  Therefore,   for 24 hours, you have the following restrictions:   - DO NOT drive a car, operate machinery, make legal/financial decisions,   sign important papers or drink alcohol.    ACTIVITY:  Today: no heavy lifting, straining or running due to procedural   sedation/anesthesia.  The following day: return to full activity including work.  DIET:  Eat and drink normally unless instructed otherwise.     TREATMENT FOR COMMON SIDE EFFECTS:  - Mild abdominal pain, nausea, belching, bloating or excessive gas:  rest,   eat lightly and use a heating pad.  - Sore Throat: treat with throat lozenges and/or gargle with warm salt   water.  - Because air was used during the procedure, expelling large amounts of air   from your rectum or belching is normal.  - If a bowel prep was taken, you may not have a bowel movement for 1-3 days.    This is normal.  SYMPTOMS TO WATCH FOR AND REPORT TO YOUR PHYSICIAN:  1. Abdominal pain or bloating, other than gas cramps.  2. Chest pain.  3. Back pain.  4. Signs of infection such as: chills or fever occurring within 24 hours   after the procedure.  5. Rectal bleeding, which would show as bright red, maroon, or black stools.   (A tablespoon of blood from the rectum is not serious, especially  if   hemorrhoids are present.)  6. Vomiting.  7. Weakness or dizziness.  GO DIRECTLY TO THE NEAREST EMERGENCY ROOM IF YOU HAVE ANY OF THE FOLLOWING:      Difficulty breathing              Chills and/or fever over 101 F   Persistent vomiting and/or vomiting blood   Severe abdominal pain   Severe chest pain   Black, tarry stools   Bleeding- more than one tablespoon   Any other symptom or condition that you feel may need urgent attention  Your doctor recommends these additional instructions:  If any biopsies were taken, your doctors clinic will contact you in 1 to 2   weeks with any results.  - Patient has a contact number available for emergencies.  The signs and   symptoms of potential delayed complications were discussed with the   patient.  Return to normal activities tomorrow.  Written discharge   instructions were provided to the patient.   - The signs and symptoms of potential delayed complications were discussed   with the patient.   - Patient has a contact number available for emergencies.   - Return to normal activities tomorrow.   - Resume previous diet.   - Continue present medications.   - Await pathology results.   - Telephone GI clinic for pathology results in 7 days.   - Observe patient's clinical course.  For questions, problems or results please call your physician - LANDEN Barry MD at Work:  [phone].  [facility], EMERGENCY ROOM PHONE NUMBER: [phone]  IF A COMPLICATION OR EMERGENCY SITUATION ARISES AND YOU ARE UNABLE TO REACH   YOUR PHYSICIAN - GO DIRECTLY TO THE EMERGENCY ROOM.  MD LANDEN Coleman MD  7/28/2022 8:43:52 AM  This report has been verified and signed electronically.  Dear patient,  As a result of recent federal legislation (The Federal Cures Act), you may   receive lab or pathology results from your procedure in your [facility]   account before your physician is able to contact you. Your physician or   their representative  will relay the results to you with their   recommendations at their soonest availability.  Thank you,  PROVATION

## 2022-07-28 NOTE — H&P
[facility]  Gastroenterology  H&P    Patient Name: Chris Fajardo  MRN: [id number]  Admission Date: 7/28/2022  Code Status: No Order    Attending Provider:   Primary Care Physician: Waldo Siddiqui MD  Principal Problem:<principal problem not specified>    Subjective:     History of Present Illness:  81-year-old individual with a history of some chronic intermittent dysphagia in in the history of ulcerative colitis with multiple comorbidities including atrial fibrillation and COPD and coronary disease who has been treated for lung cancer and prostate CA.  he comes in for endoscopic evaluation (upper) his weight has generally stabilized after a moderate weight loss over the past several months.    Past Medical History:   Diagnosis Date    Atrial fibrillation     Chronic obstructive pulmonary disease (COPD) 06/14/2022    COPD (chronic obstructive pulmonary disease)     Coronary artery disease     Diabetes mellitus     Disorder of thyroid gland     Elevated cholesterol     Gastric ulcer     History of COVID-19     Hypertension     Lung cancer     Mild CAD 06/14/2022    Peripheral vascular disease, unspecified 06/14/2022    Prostate CA     Status post repair of abdominal aortic aneurysm (AAA) using bifurcation graft 06/14/2022    Stroke        Past Surgical History:   Procedure Laterality Date    APPENDECTOMY      CARDIAC CATHETERIZATION      CATHETERIZATION OF BOTH LEFT AND RIGHT HEART N/A 06/14/2022    Procedure: CATHETERIZATION, HEART, BOTH LEFT AND RIGHT;  Surgeon: Donny Rangel DO;  Location: [facility] CATH LAB;  Service: Cardiology;  Laterality: N/A;  RLHC POSS LE ANGIO    COLONOSCOPY  05/27/2020    ESOPHAGOGASTRODUODENOSCOPY  2021    SHOULDER SURGERY Right     Rotator cuff repair    TONSILLECTOMY         Review of patient's allergies indicates:   Allergen Reactions    Demerol [meperidine] Nausea And Vomiting     Family History     Problem Relation (Age of Onset)     Cancer Sister, Brother    Diabetes Father        Tobacco Use    Smoking status: Former Smoker     Packs/day: 2.00     Years: 60.00     Pack years: 120.00     Types: Cigarettes    Smokeless tobacco: Never Used    Tobacco comment: patient quit smoking 2 years ago    Substance and Sexual Activity    Alcohol use: Not Currently    Drug use: Never    Sexual activity: Not on file     Review of Systems  Objective:     Vital Signs (Most Recent):  Temp: 97.5 °F (36.4 °C) (07/28/22 0705)  Pulse: 96 (07/28/22 0705)  Resp: 19 (07/28/22 0705)  BP: (!) 142/92 (07/28/22 0711) Vital Signs (24h Range):  Temp:  [97.5 °F (36.4 °C)] 97.5 °F (36.4 °C)  Pulse:  [96] 96  Resp:  [19] 19  BP: (142)/(92) 142/92     Weight: 92.5 kg (204 lb) (07/28/22 0705)  Body mass index is 27.67 kg/m².    No intake or output data in the 24 hours ending 07/28/22 0809    Lines/Drains/Airways     Peripheral Intravenous Line  Duration                Peripheral IV - Single Lumen 07/28/22 0718 22 G Posterior;Right Hand <1 day                Physical Exam  Vitals and nursing note reviewed.   Constitutional:       Appearance: He is obese.   Cardiovascular:      Rate and Rhythm: Normal rate. Rhythm irregular.   Pulmonary:      Comments: Decreased breath sounds bilaterally  Abdominal:      Palpations: Abdomen is soft.   Skin:     General: Skin is warm.   Neurological:      General: No focal deficit present.         Significant Labs:      Significant Imaging:    Assessment/Plan:     There are no hospital problems to display for this patient.    Upper endoscopy.    SALOMÓN Barry MD  Gastroenterology  [facility]

## 2022-07-28 NOTE — TRANSFER OF CARE
Anesthesia Transfer of Care Note    Patient: Chris Fajardo    Procedure(s) Performed: Procedure(s) (LRB):  EGD (N/A)  ESOPHAGOSCOPY, WITH DILATION  EGD, WITH CLOSED BIOPSY  EGD, WITH FOREIGN BODY REMOVAL    Patient location: PACU    Anesthesia Type: general    Transport from OR: Transported from OR on room air with adequate spontaneous ventilation    Post pain: adequate analgesia    Post assessment: no apparent anesthetic complications    Post vital signs: stable    Level of consciousness: sedated and responds to stimulation    Nausea/Vomiting: no nausea/vomiting    Complications: none    Transfer of care protocol was followed      Last vitals:   Visit Vitals  BP (!) 142/92   Pulse 96   Temp 36.4 °C (97.5 °F)   Resp 19   Ht 6' (1.829 m)   Wt 92.5 kg (204 lb)   BMI 27.67 kg/m²

## 2022-07-28 NOTE — ANESTHESIA PREPROCEDURE EVALUATION
07/28/2022  Chris Fajardo is a 81 y.o., male presents for EGD for evaluation of GERD/dysphagia.      Pre-op Assessment    I have reviewed the Patient Summary Reports.     I have reviewed the Nursing Notes. I have reviewed the NPO Status.   I have reviewed the Medications.     Review of Systems  Anesthesia Hx:  No problems with previous Anesthesia    Social:  Former Smoker    Cardiovascular:   Exercise tolerance: poor Hypertension CAD  Dysrhythmias atrial fibrillation  Peripheral Arterial Disease    Pulmonary:   COPD    Hepatic/GI:   PUD,    Neurological:   CVA    Endocrine:   Diabetes        Physical Exam  General: Well nourished, Cooperative, Alert and Oriented    Airway:  Mallampati: III   Mouth Opening: Normal  TM Distance: Normal  Tongue: Normal  Neck ROM: Extension Decreased    Dental:  Edentulous    Chest/Lungs:  Clear to auscultation, Normal Respiratory Rate    Heart:  Rate: Normal  Rhythm: Irregularly Irregular  Sounds: Normal    Abdomen:  Normal, Soft, Nontender        Anesthesia Plan  Type of Anesthesia, risks & benefits discussed:    Anesthesia Type: MAC  Intra-op Monitoring Plan: Standard ASA Monitors  Post Op Pain Control Plan: multimodal analgesia  Induction:  IV  Airway Plan: Direct  Informed Consent: Informed consent signed with the Patient and all parties understand the risks and agree with anesthesia plan.  All questions answered.   ASA Score: 3  Day of Surgery Review of History & Physical: H&P Update referred to the surgeon/provider.    Ready For Surgery From Anesthesia Perspective.     .

## 2022-08-01 LAB
ESTROGEN SERPL-MCNC: NORMAL PG/ML
INSULIN SERPL-ACNC: NORMAL U[IU]/ML
LAB AP CLINICAL INFORMATION: NORMAL
LAB AP GROSS DESCRIPTION: NORMAL
LAB AP REPORT FOOTNOTES: NORMAL
T3RU NFR SERPL: NORMAL %

## 2022-08-02 ENCOUNTER — OFFICE VISIT (OUTPATIENT)
Dept: CARDIAC SURGERY | Facility: CLINIC | Age: 82
End: 2022-08-02
Payer: MEDICARE

## 2022-08-02 VITALS
WEIGHT: 209 LBS | HEIGHT: 72 IN | BODY MASS INDEX: 28.31 KG/M2 | DIASTOLIC BLOOD PRESSURE: 90 MMHG | HEART RATE: 84 BPM | SYSTOLIC BLOOD PRESSURE: 176 MMHG

## 2022-08-02 DIAGNOSIS — M96.842 SEROMA OF MUSCULOSKELETAL STRUCTURE AFTER MUSCULOSKELETAL SYSTEM PROCEDURE: Primary | ICD-10-CM

## 2022-08-02 PROBLEM — R09.02 HYPOXEMIA: Status: ACTIVE | Noted: 2022-08-02

## 2022-08-02 PROCEDURE — 99213 OFFICE O/P EST LOW 20 MIN: CPT | Mod: ,,, | Performed by: SPECIALIST

## 2022-08-02 PROCEDURE — 99213 PR OFFICE/OUTPT VISIT, EST, LEVL III, 20-29 MIN: ICD-10-PCS | Mod: ,,, | Performed by: SPECIALIST

## 2022-08-02 RX ORDER — ARMODAFINIL 250 MG/1
250 TABLET ORAL DAILY
COMMUNITY
End: 2022-11-28 | Stop reason: SDUPTHER

## 2022-08-02 NOTE — PROGRESS NOTES
[facility]  History & Physical  Cardiothoracic Surgery    SUBJECTIVE:     Chief Complaint/Reason for Visit:   Chief Complaint   Patient presents with    Pre-op Exam      RE: Rt Groin Hematoma        History of Present Illness:  Patient is a 81 y.o. male presents   Referred by Dr. Rangel to evaluate a right groin supposed hematoma. He is an 81-year-old gentleman who is well known to me from a previous endovascular abdominal aortic aneurysm repair 3 years ago.  He has done well from that and has no complaints.  He had an incidental finding of a lump in his right groin and had an ultrasound that suggested that this is a 3.5 cm hematoma.  This is nontender and there is no erythema.  This has been there basically since his surgery.  This is more likely a seroma.  As patient is asymptomatic there is no treatment needed at this time.    Review of patient's allergies indicates:   Allergen Reactions    Atropine-demerol     Meperidine Nausea And Vomiting     Other reaction(s): GI Intolerance, UNKNOWN  Vomiting         Past Medical History:   Diagnosis Date    Atrial fibrillation     Chronic obstructive pulmonary disease (COPD) 06/14/2022    COPD (chronic obstructive pulmonary disease)     Coronary artery disease     Diabetes mellitus     Disorder of thyroid gland     Elevated cholesterol     Gastric ulcer     History of COVID-19     Hypertension     Lung cancer     Mild CAD 06/14/2022    Peripheral vascular disease, unspecified 06/14/2022    Prostate CA     Status post repair of abdominal aortic aneurysm (AAA) using bifurcation graft 06/14/2022    Stroke      Past Surgical History:   Procedure Laterality Date    APPENDECTOMY      CARDIAC CATHETERIZATION      CATHETERIZATION OF BOTH LEFT AND RIGHT HEART N/A 06/14/2022    Procedure: CATHETERIZATION, HEART, BOTH LEFT AND RIGHT;  Surgeon: Donny Rangel DO;  Location: [facility] CATH LAB;  Service: Cardiology;  Laterality: N/A;   RLHC POSS LE ANGIO    COLONOSCOPY  05/27/2020    ESOPHAGOGASTRODUODENOSCOPY  2021    ESOPHAGOGASTRODUODENOSCOPY N/A 7/28/2022    Procedure: EGD;  Surgeon: SALOMÓN Barry MD;  Location: [facility] ENDOSCOPY;  Service: Gastroenterology;  Laterality: N/A;    ESOPHAGOSCOPY, WITH DILATION  7/28/2022    Procedure: ESOPHAGOSCOPY, WITH DILATION;  Surgeon: SALOMÓN Barry MD;  Location: [facility] ENDOSCOPY;  Service: Gastroenterology;;  savory dilation 39 42 and 45 Fr    SHOULDER SURGERY Right     Rotator cuff repair    TONSILLECTOMY       Family History   Problem Relation Age of Onset    Diabetes Father     Cancer Sister     Cancer Brother      Social History     Tobacco Use    Smoking status: Former Smoker     Packs/day: 2.00     Years: 60.00     Pack years: 120.00     Types: Cigarettes    Smokeless tobacco: Never Used    Tobacco comment: patient quit smoking 2 years ago    Substance Use Topics    Alcohol use: Not Currently    Drug use: Never        Review of Systems:  Review of Systems   Constitutional: Negative.   HENT: Negative.    Eyes: Negative.    Cardiovascular: Positive for dyspnea on exertion and leg swelling.   Respiratory: Positive for shortness of breath.    Endocrine: Negative.    Skin: Negative.    Musculoskeletal: Negative.    Gastrointestinal: Negative.    Genitourinary: Negative.    Neurological: Negative.    Psychiatric/Behavioral: Negative.        OBJECTIVE:     Vital Signs (Most Recent):  Pulse: 84 (08/02/22 0840)  BP: (!) 176/90 (08/02/22 0840)    Admission: Weight: 94.8 kg (209 lb) (08/02/22 0840)   Most Recent: Weight: 94.8 kg (209 lb) (08/02/22 0840)    Physical Exam:  Physical Exam  Constitutional:       Appearance: He is ill-appearing.   HENT:      Head: Normocephalic and atraumatic.   Eyes:      Pupils: Pupils are equal, round, and reactive to light.   Cardiovascular:      Rate and Rhythm: Normal rate and regular rhythm.      Pulses: Normal pulses.      Heart sounds:  Normal heart sounds.   Pulmonary:      Effort: Pulmonary effort is normal.      Breath sounds: Normal breath sounds.   Abdominal:      General: Abdomen is flat.      Palpations: Abdomen is soft.   Musculoskeletal:         General: Normal range of motion.      Cervical back: Normal range of motion.   Skin:     Comments: Multiple venous varicosities of the right lower extremity with some 1.5+ edema   Neurological:      General: No focal deficit present.      Mental Status: He is alert and oriented to person, place, and time.   Psychiatric:         Mood and Affect: Mood normal.         Behavior: Behavior normal.         Diagnostic Results:  US: Reviewed      ASSESSMENT/PLAN:     No diagnosis found.      Small Right groin postoperative seroma  Asymptomatic   No treatment indicated at this time

## 2022-10-11 LAB — TESTOST SERPL-MCNC: 77 NG/DL

## 2022-11-28 DIAGNOSIS — G47.411 NARCOLEPSY AND CATAPLEXY: Primary | ICD-10-CM

## 2022-11-29 RX ORDER — ARMODAFINIL 250 MG/1
250 TABLET ORAL DAILY
Qty: 30 TABLET | Refills: 5 | Status: SHIPPED | OUTPATIENT
Start: 2022-11-29

## 2022-12-08 DIAGNOSIS — I71.40 ABDOMINAL AORTIC ANEURYSM (AAA) WITHOUT RUPTURE, UNSPECIFIED PART: Primary | ICD-10-CM

## 2023-02-20 ENCOUNTER — LAB REQUISITION (OUTPATIENT)
Dept: LAB | Facility: HOSPITAL | Age: 83
End: 2023-02-20
Payer: MEDICARE

## 2023-02-20 DIAGNOSIS — I48.91 UNSPECIFIED ATRIAL FIBRILLATION: ICD-10-CM

## 2023-02-20 DIAGNOSIS — I10 ESSENTIAL (PRIMARY) HYPERTENSION: ICD-10-CM

## 2023-02-20 LAB
ALBUMIN SERPL-MCNC: 3.2 G/DL (ref 3.4–4.8)
ALBUMIN/GLOB SERPL: 1.1 RATIO (ref 1.1–2)
ALP SERPL-CCNC: 119 UNIT/L (ref 40–150)
ALT SERPL-CCNC: 21 UNIT/L (ref 0–55)
AST SERPL-CCNC: 18 UNIT/L (ref 5–34)
BASOPHILS # BLD AUTO: 0.04 X10(3)/MCL (ref 0–0.2)
BASOPHILS NFR BLD AUTO: 0.4 %
BILIRUBIN DIRECT+TOT PNL SERPL-MCNC: 0.3 MG/DL
BUN SERPL-MCNC: 26.7 MG/DL (ref 8.4–25.7)
CALCIUM SERPL-MCNC: 8.8 MG/DL (ref 8.8–10)
CHLORIDE SERPL-SCNC: 99 MMOL/L (ref 98–107)
CHOLEST SERPL-MCNC: 129 MG/DL
CHOLEST/HDLC SERPL: 3 {RATIO} (ref 0–5)
CO2 SERPL-SCNC: 27 MMOL/L (ref 23–31)
CREAT SERPL-MCNC: 0.77 MG/DL (ref 0.73–1.18)
CREAT UR-MCNC: 85.5 MG/DL (ref 63–166)
EOSINOPHIL # BLD AUTO: 0.19 X10(3)/MCL (ref 0–0.9)
EOSINOPHIL NFR BLD AUTO: 1.8 %
ERYTHROCYTE [DISTWIDTH] IN BLOOD BY AUTOMATED COUNT: 13.2 % (ref 11.5–17)
EST. AVERAGE GLUCOSE BLD GHB EST-MCNC: 119.8 MG/DL
GFR SERPLBLD CREATININE-BSD FMLA CKD-EPI: >60 MLS/MIN/1.73/M2
GLOBULIN SER-MCNC: 2.8 GM/DL (ref 2.4–3.5)
GLUCOSE SERPL-MCNC: 88 MG/DL (ref 82–115)
HBA1C MFR BLD: 5.8 %
HCT VFR BLD AUTO: 38.3 % (ref 42–52)
HDLC SERPL-MCNC: 45 MG/DL (ref 35–60)
HGB BLD-MCNC: 12.5 G/DL (ref 14–18)
IMM GRANULOCYTES # BLD AUTO: 0.09 X10(3)/MCL (ref 0–0.04)
IMM GRANULOCYTES NFR BLD AUTO: 0.8 %
LDLC SERPL CALC-MCNC: 68 MG/DL (ref 50–140)
LYMPHOCYTES # BLD AUTO: 1.29 X10(3)/MCL (ref 0.6–4.6)
LYMPHOCYTES NFR BLD AUTO: 12.1 %
MCH RBC QN AUTO: 32.6 PG
MCHC RBC AUTO-ENTMCNC: 32.6 G/DL (ref 33–36)
MCV RBC AUTO: 99.7 FL (ref 80–94)
MONOCYTES # BLD AUTO: 1.03 X10(3)/MCL (ref 0.1–1.3)
MONOCYTES NFR BLD AUTO: 9.7 %
NEUTROPHILS # BLD AUTO: 8.01 X10(3)/MCL (ref 2.1–9.2)
NEUTROPHILS NFR BLD AUTO: 75.2 %
NRBC BLD AUTO-RTO: 0 %
PLATELET # BLD AUTO: 260 X10(3)/MCL (ref 130–400)
PMV BLD AUTO: 9.5 FL (ref 7.4–10.4)
POTASSIUM SERPL-SCNC: 4.7 MMOL/L (ref 3.5–5.1)
PROT SERPL-MCNC: 6 GM/DL (ref 5.8–7.6)
PROT UR STRIP-MCNC: 57.6 MG/DL
RBC # BLD AUTO: 3.84 X10(6)/MCL (ref 4.7–6.1)
SODIUM SERPL-SCNC: 138 MMOL/L (ref 136–145)
T4 FREE SERPL-MCNC: 2 NG/DL (ref 0.7–1.48)
TRIGL SERPL-MCNC: 81 MG/DL (ref 34–140)
TSH SERPL-ACNC: 0.02 UIU/ML (ref 0.35–4.94)
URINE PROTEIN/CREATININE RATIO (OHS): 0.7
VLDLC SERPL CALC-MCNC: 16 MG/DL
WBC # SPEC AUTO: 10.7 X10(3)/MCL (ref 4.5–11.5)

## 2023-02-20 PROCEDURE — 80061 LIPID PANEL: CPT | Performed by: INTERNAL MEDICINE

## 2023-02-20 PROCEDURE — 84439 ASSAY OF FREE THYROXINE: CPT | Performed by: INTERNAL MEDICINE

## 2023-02-20 PROCEDURE — 85025 COMPLETE CBC W/AUTO DIFF WBC: CPT | Performed by: INTERNAL MEDICINE

## 2023-02-20 PROCEDURE — 80053 COMPREHEN METABOLIC PANEL: CPT | Performed by: INTERNAL MEDICINE

## 2023-02-20 PROCEDURE — 82570 ASSAY OF URINE CREATININE: CPT | Performed by: INTERNAL MEDICINE

## 2023-02-20 PROCEDURE — 83036 HEMOGLOBIN GLYCOSYLATED A1C: CPT | Performed by: INTERNAL MEDICINE

## 2023-02-20 PROCEDURE — 84443 ASSAY THYROID STIM HORMONE: CPT | Performed by: INTERNAL MEDICINE

## 2023-03-24 ENCOUNTER — LAB REQUISITION (OUTPATIENT)
Dept: LAB | Facility: HOSPITAL | Age: 83
End: 2023-03-24
Payer: MEDICARE

## 2023-03-24 DIAGNOSIS — E78.5 HYPERLIPIDEMIA, UNSPECIFIED: ICD-10-CM

## 2023-03-24 LAB
ALBUMIN SERPL-MCNC: 2.7 G/DL (ref 3.4–4.8)
ALBUMIN/GLOB SERPL: 0.9 RATIO (ref 1.1–2)
ALP SERPL-CCNC: 109 UNIT/L (ref 40–150)
ALT SERPL-CCNC: 11 UNIT/L (ref 0–55)
AST SERPL-CCNC: 14 UNIT/L (ref 5–34)
BILIRUBIN DIRECT+TOT PNL SERPL-MCNC: 0.2 MG/DL
BUN SERPL-MCNC: 23.4 MG/DL (ref 8.4–25.7)
CALCIUM SERPL-MCNC: 9.2 MG/DL (ref 8.8–10)
CHLORIDE SERPL-SCNC: 104 MMOL/L (ref 98–107)
CO2 SERPL-SCNC: 28 MMOL/L (ref 23–31)
CREAT SERPL-MCNC: 0.85 MG/DL (ref 0.73–1.18)
GFR SERPLBLD CREATININE-BSD FMLA CKD-EPI: >60 MLS/MIN/1.73/M2
GLOBULIN SER-MCNC: 3.1 GM/DL (ref 2.4–3.5)
GLUCOSE SERPL-MCNC: 86 MG/DL (ref 82–115)
POTASSIUM SERPL-SCNC: 4.5 MMOL/L (ref 3.5–5.1)
PROT SERPL-MCNC: 5.8 GM/DL (ref 5.8–7.6)
SODIUM SERPL-SCNC: 142 MMOL/L (ref 136–145)

## 2023-03-24 PROCEDURE — 80053 COMPREHEN METABOLIC PANEL: CPT | Performed by: INTERNAL MEDICINE

## 2024-11-14 ENCOUNTER — TELEPHONE (OUTPATIENT)
Dept: PULMONOLOGY | Facility: CLINIC | Age: 84
End: 2024-11-14
Payer: MEDICARE

## 2024-11-14 NOTE — TELEPHONE ENCOUNTER
----- Message from Violette sent at 2024  2:14 PM CST -----  Contact: wife  [phone]  Type:  Needs Medical Advice    Who Called: Diana    wife of  patient   Symptoms (please be specific): medical records   How long has patient had these symptoms:    Pharmacy name and phone #:    Would the patient rather a call back or a response via [facility]? Callback   Best Call Back Number:  [phone]  Additional Information:wife called in this afternoon requesting a call back concerning the records of xray of the chest done on 2000 and was  reviewed by Dr. Andrew on 2000. She would like a call back please  [phone]. Thanks

## 2024-11-14 NOTE — TELEPHONE ENCOUNTER
Spoke with Diana, wife, to see if we can access a cxr from 07/2000. Notifed her that our electronic records on this system does not go back that far. So I was unable to assist her.

## (undated) DEVICE — BAG LABGUARD BIOHAZARD 6X9IN

## (undated) DEVICE — TUBING O2 FEMALE CONN 13FT

## (undated) DEVICE — SHEATH INTRODUCER 7FR 11CM

## (undated) DEVICE — SET ANGIO ACIST CVI ANGIOTOUCH

## (undated) DEVICE — SHEATH INTRODUCER 5FR 10CM

## (undated) DEVICE — CONTRAST ISOVUE 300 50ML

## (undated) DEVICE — CATH SWAN GANZ STND 7FR

## (undated) DEVICE — Device

## (undated) DEVICE — CONTAINER SPECIMEN SCREW 4OZ

## (undated) DEVICE — CATH EXPO JL4 JR4 PIG155 MULTI

## (undated) DEVICE — GUIDEWIRE TAPR STIFF ANG 260CM

## (undated) DEVICE — GUIDEWIRE STD .035X260CM ANG

## (undated) DEVICE — SHEATH INTRODUCER BRITE 5X23

## (undated) DEVICE — TIP SUCTION YANKAUER

## (undated) DEVICE — CATH SWAN GANZ PACING 7FR

## (undated) DEVICE — KIT CANIST SUCTION 1200CC

## (undated) DEVICE — PAD HEARTSTART DEFIB ADULT

## (undated) DEVICE — KIT SURGICAL COLON .25 1.1OZ

## (undated) DEVICE — FORCEP BX LG CAP 2.8MMX240CM

## (undated) DEVICE — COLLECTION SPECIMEN NEPTUNE

## (undated) DEVICE — GUIDEWIRE SAFE T J TIP 145X2.5

## (undated) DEVICE — CANNULA NASAL ADULT

## (undated) DEVICE — CATH ANGLED GLIDE CATH 5FR

## (undated) DEVICE — SOL IRRI STRL WATER 1000ML